# Patient Record
Sex: FEMALE | Race: WHITE | NOT HISPANIC OR LATINO | Employment: OTHER | URBAN - METROPOLITAN AREA
[De-identification: names, ages, dates, MRNs, and addresses within clinical notes are randomized per-mention and may not be internally consistent; named-entity substitution may affect disease eponyms.]

---

## 2017-06-05 ENCOUNTER — OFFICE VISIT (OUTPATIENT)
Dept: ORTHOPEDICS | Facility: CLINIC | Age: 47
End: 2017-06-05
Payer: MEDICARE

## 2017-06-05 ENCOUNTER — HOSPITAL ENCOUNTER (OUTPATIENT)
Dept: RADIOLOGY | Facility: HOSPITAL | Age: 47
Discharge: HOME | End: 2017-06-05
Attending: ORTHOPAEDIC SURGERY
Payer: MEDICARE

## 2017-06-05 VITALS — HEIGHT: 63 IN | WEIGHT: 190 LBS | BODY MASS INDEX: 33.66 KG/M2

## 2017-06-05 DIAGNOSIS — R52 PAIN: ICD-10-CM

## 2017-06-05 DIAGNOSIS — R52 PAIN: Primary | ICD-10-CM

## 2017-06-05 DIAGNOSIS — M16.11 OSTEOARTHRITIS OF RIGHT HIP, UNSPECIFIED OSTEOARTHRITIS TYPE: Primary | ICD-10-CM

## 2017-06-05 PROCEDURE — 99213 OFFICE O/P EST LOW 20 MIN: CPT | Mod: PBBFAC,25 | Performed by: PHYSICIAN ASSISTANT

## 2017-06-05 PROCEDURE — 73502 X-RAY EXAM HIP UNI 2-3 VIEWS: CPT | Mod: TC,RT

## 2017-06-05 PROCEDURE — 73502 X-RAY EXAM HIP UNI 2-3 VIEWS: CPT | Mod: 26,RT,, | Performed by: RADIOLOGY

## 2017-06-05 PROCEDURE — 99203 OFFICE O/P NEW LOW 30 MIN: CPT | Mod: S$PBB,,, | Performed by: PHYSICIAN ASSISTANT

## 2017-06-05 PROCEDURE — 99999 PR PBB SHADOW E&M-EST. PATIENT-LVL III: CPT | Mod: PBBFAC,,, | Performed by: PHYSICIAN ASSISTANT

## 2017-06-05 RX ORDER — DICLOFENAC SODIUM 75 MG/1
75 TABLET, DELAYED RELEASE ORAL 2 TIMES DAILY
Qty: 60 TABLET | Refills: 3 | Status: SHIPPED | OUTPATIENT
Start: 2017-06-05 | End: 2017-09-23 | Stop reason: SDUPTHER

## 2017-06-09 NOTE — PROGRESS NOTES
Subjective:      Patient ID: Nidhi Avendaño is a 46 y.o. female.    Chief Complaint: Pain of the Right Hip and Hip Pain (right hip pain rates it a 9 states the pain is unbearable )    HPI    Patient is a 46 year old female who presents to clinic to establish care for advanced degenerative changes in her right hip. Patient stated that she was previously see by an orthopedist at [facility] who informed her that she has degenerative changes in her hip. She reports that he obtained an MRI which was negative for AVN, fracture, but possible labrum tearing. Patient does not have copy of MRI or report with her. Patient stated that he suggested a diagnostic therapeutic interarticular injection, but she never got a call to set it up so she is changing providers.   Patient stated that she is having constant groin pain. Pain is increased with range of motion and weightbearing. Patient has taken Advil. Tramadol and Tylenol 3 with minimal relief. Patient denied back pain numbness or tingling. Denied prior injection, trauma or surgery. Denied fever shills increased warmth night sweats.     Review of Systems   Constitution: Negative for chills and fever.   Cardiovascular: Negative for chest pain.   Respiratory: Negative for cough and shortness of breath.    Skin: Negative for color change, dry skin, itching, nail changes, poor wound healing and rash.   Musculoskeletal:        Right hip pain   Neurological: Negative for dizziness.   Psychiatric/Behavioral: Negative for altered mental status. The patient is not nervous/anxious.    All other systems reviewed and are negative.        Objective:      General    Constitutional: She is oriented to person, place, and time. She appears well-developed and well-nourished. No distress.   HENT:   Head: Atraumatic.   Eyes: Conjunctivae are normal.   Cardiovascular: Normal rate.    Pulmonary/Chest: Effort normal.   Neurological: She is alert and oriented to person, place, and time.    Psychiatric: She has a normal mood and affect. Her behavior is normal.             Right Hip Exam     Range of Motion   Extension: normal   Flexion: normal   Internal Rotation: normal   External Rotation: normal     Tests   Log Roll: positive    Other   Sensation: normal    Comments:  Increased pain with all ROM      Muscle Strength   Right Lower Extremity   Hip Abduction: 3/5   Hip Adduction: 3/5   Hip Flexion: 4/5   Ankle Dorsiflexion:  5/5             RADS: significant joint space narrowing in her right hip.   Assessment:       Encounter Diagnosis   Name Primary?    Osteoarthritis of right hip, unspecified osteoarthritis type Yes          Plan:       Discussed plan with patient . She would like to avoid any surgical intervention unless necessary. Patient would like interarticular injection as recommended by her previous orthopedist. Order placed for injection, therapeutic and diagnostic. Patient is to return to clinic as needed. She understand the surgical intervention may be needed in future.

## 2017-06-19 ENCOUNTER — TELEPHONE (OUTPATIENT)
Dept: ORTHOPEDICS | Facility: CLINIC | Age: 47
End: 2017-06-19

## 2017-06-19 NOTE — TELEPHONE ENCOUNTER
----- Message from Sebastien Gomez sent at 6/19/2017  3:16 PM CDT -----  Contact: self@home, [phone]  Pt called in stating that she left a message earlier for a call back because she is in a lot of pain. Please return call as soon as possible    Thank you

## 2017-06-19 NOTE — TELEPHONE ENCOUNTER
Spoke with pt.   States she is in extreme pain in her hip.  Pt reports she is scheduled for hip injection on 6/22   Pt requesting something for pain.   Advised pt of the orthopedic department policy.  Pt verbalized understanding.   Forwarded message to SHANE Rawls for review.

## 2017-06-21 ENCOUNTER — TELEPHONE (OUTPATIENT)
Dept: RADIOLOGY | Facility: HOSPITAL | Age: 47
End: 2017-06-21

## 2017-06-22 ENCOUNTER — HOSPITAL ENCOUNTER (OUTPATIENT)
Dept: RADIOLOGY | Facility: HOSPITAL | Age: 47
Discharge: HOME | End: 2017-06-22
Attending: ORTHOPAEDIC SURGERY
Payer: MEDICARE

## 2017-06-22 VITALS
HEART RATE: 73 BPM | SYSTOLIC BLOOD PRESSURE: 130 MMHG | DIASTOLIC BLOOD PRESSURE: 72 MMHG | RESPIRATION RATE: 20 BRPM | OXYGEN SATURATION: 98 %

## 2017-06-22 DIAGNOSIS — M16.11 OSTEOARTHRITIS OF RIGHT HIP, UNSPECIFIED OSTEOARTHRITIS TYPE: ICD-10-CM

## 2017-06-22 PROCEDURE — 25500020 PHARM REV CODE 255: Performed by: ORTHOPAEDIC SURGERY

## 2017-06-22 PROCEDURE — 77002 NEEDLE LOCALIZATION BY XRAY: CPT | Mod: 26,GC,, | Performed by: RADIOLOGY

## 2017-06-22 PROCEDURE — 77002 NEEDLE LOCALIZATION BY XRAY: CPT | Mod: TC

## 2017-06-22 PROCEDURE — 20610 DRAIN/INJ JOINT/BURSA W/O US: CPT | Mod: GC,,, | Performed by: RADIOLOGY

## 2017-06-22 PROCEDURE — 63600175 PHARM REV CODE 636 W HCPCS: Performed by: ORTHOPAEDIC SURGERY

## 2017-06-22 PROCEDURE — 25000003 PHARM REV CODE 250: Performed by: ORTHOPAEDIC SURGERY

## 2017-06-22 RX ORDER — METHYLPREDNISOLONE ACETATE 80 MG/ML
60 INJECTION, SUSPENSION INTRA-ARTICULAR; INTRALESIONAL; INTRAMUSCULAR; SOFT TISSUE ONCE
Status: COMPLETED | OUTPATIENT
Start: 2017-06-22 | End: 2017-06-22

## 2017-06-22 RX ORDER — LIDOCAINE HYDROCHLORIDE 10 MG/ML
5 INJECTION, SOLUTION EPIDURAL; INFILTRATION; INTRACAUDAL; PERINEURAL ONCE
Status: COMPLETED | OUTPATIENT
Start: 2017-06-22 | End: 2017-06-22

## 2017-06-22 RX ORDER — BUPIVACAINE HYDROCHLORIDE 2.5 MG/ML
5 INJECTION, SOLUTION EPIDURAL; INFILTRATION; INTRACAUDAL ONCE
Status: COMPLETED | OUTPATIENT
Start: 2017-06-22 | End: 2017-06-22

## 2017-06-22 RX ADMIN — IOHEXOL 1 ML: 300 INJECTION, SOLUTION INTRAVENOUS at 11:06

## 2017-06-22 RX ADMIN — LIDOCAINE HYDROCHLORIDE 50 MG: 10 INJECTION, SOLUTION EPIDURAL; INFILTRATION; INTRACAUDAL; PERINEURAL at 11:06

## 2017-06-22 RX ADMIN — METHYLPREDNISOLONE ACETATE 60 MG: 80 INJECTION, SUSPENSION INTRA-ARTICULAR; INTRALESIONAL; INTRAMUSCULAR; SOFT TISSUE at 11:06

## 2017-06-22 RX ADMIN — BUPIVACAINE HYDROCHLORIDE 12.5 MG: 2.5 INJECTION, SOLUTION EPIDURAL; INFILTRATION; INTRACAUDAL; PERINEURAL at 11:06

## 2017-06-22 NOTE — PROGRESS NOTES
Medications needed for hip injection with DR. Herrera provided. VSS. No complaints at this time. Pt in care of Alex Kim and MD.

## 2017-07-19 ENCOUNTER — TELEPHONE (OUTPATIENT)
Dept: ORTHOPEDICS | Facility: CLINIC | Age: 47
End: 2017-07-19

## 2017-07-19 NOTE — TELEPHONE ENCOUNTER
Ortho Telephone Triage Message  [phone]  Patient C/O:  Worsening Left hip pain beginning on 7/16/17. Pt states that she had received a steroid injection for R hip pain and believes L hip pain increased after steroid injection wore off.  Pt states that she was seen at [facility] ED yesterday morning. Pt states no xrays were taken. Pt received Dilaudid injection for pain.  Pt requests Ortho appt for left hip pain.   HX: OA R hip  Resolution: Appt scheduled with SHANE Reyes on 7/26/17 at 7:00am. Pt states understanding. Appt slip mailed.

## 2017-07-19 NOTE — TELEPHONE ENCOUNTER
----- Message from Wenceslao Coronel sent at 7/19/2017 10:38 AM CDT -----  Contact: self/home  Pt went to the ED last night with lt hip pain and would like to be seen today.

## 2017-09-25 RX ORDER — DICLOFENAC SODIUM 75 MG/1
TABLET, DELAYED RELEASE ORAL
Qty: 60 TABLET | Refills: 0 | Status: SHIPPED | OUTPATIENT
Start: 2017-09-25 | End: 2020-07-13

## 2017-09-25 RX ORDER — DICLOFENAC SODIUM 75 MG/1
TABLET, DELAYED RELEASE ORAL
Qty: 180 TABLET | Refills: 0 | OUTPATIENT
Start: 2017-09-25

## 2020-01-06 ENCOUNTER — OFFICE VISIT (OUTPATIENT)
Dept: FAMILY MEDICINE | Facility: CLINIC | Age: 50
End: 2020-01-06
Payer: MEDICARE

## 2020-01-06 VITALS
SYSTOLIC BLOOD PRESSURE: 152 MMHG | HEART RATE: 89 BPM | OXYGEN SATURATION: 98 % | RESPIRATION RATE: 20 BRPM | BODY MASS INDEX: 33.48 KG/M2 | WEIGHT: 188.94 LBS | HEIGHT: 63 IN | TEMPERATURE: 99 F | DIASTOLIC BLOOD PRESSURE: 80 MMHG

## 2020-01-06 DIAGNOSIS — F19.90 ILLICIT DRUG USE: ICD-10-CM

## 2020-01-06 DIAGNOSIS — Z13.220 ENCOUNTER FOR LIPID SCREENING FOR CARDIOVASCULAR DISEASE: ICD-10-CM

## 2020-01-06 DIAGNOSIS — Z12.31 ENCOUNTER FOR SCREENING MAMMOGRAM FOR BREAST CANCER: ICD-10-CM

## 2020-01-06 DIAGNOSIS — G47.00 INSOMNIA, UNSPECIFIED TYPE: ICD-10-CM

## 2020-01-06 DIAGNOSIS — I10 ESSENTIAL HYPERTENSION: ICD-10-CM

## 2020-01-06 DIAGNOSIS — Z00.00 ANNUAL PHYSICAL EXAM: Primary | ICD-10-CM

## 2020-01-06 DIAGNOSIS — M16.12 OSTEOARTHRITIS OF LEFT HIP, UNSPECIFIED OSTEOARTHRITIS TYPE: ICD-10-CM

## 2020-01-06 DIAGNOSIS — F43.10 PTSD (POST-TRAUMATIC STRESS DISORDER): ICD-10-CM

## 2020-01-06 DIAGNOSIS — E66.9 OBESITY (BMI 30.0-34.9): ICD-10-CM

## 2020-01-06 DIAGNOSIS — N30.00 ACUTE CYSTITIS WITHOUT HEMATURIA: ICD-10-CM

## 2020-01-06 DIAGNOSIS — Z13.6 ENCOUNTER FOR LIPID SCREENING FOR CARDIOVASCULAR DISEASE: ICD-10-CM

## 2020-01-06 PROCEDURE — 3079F PR MOST RECENT DIASTOLIC BLOOD PRESSURE 80-89 MM HG: ICD-10-PCS | Mod: CPTII,S$GLB,, | Performed by: FAMILY MEDICINE

## 2020-01-06 PROCEDURE — 3079F DIAST BP 80-89 MM HG: CPT | Mod: CPTII,S$GLB,, | Performed by: FAMILY MEDICINE

## 2020-01-06 PROCEDURE — 3077F PR MOST RECENT SYSTOLIC BLOOD PRESSURE >= 140 MM HG: ICD-10-PCS | Mod: CPTII,S$GLB,, | Performed by: FAMILY MEDICINE

## 2020-01-06 PROCEDURE — 96372 PR INJECTION,THERAP/PROPH/DIAG2ST, IM OR SUBCUT: ICD-10-PCS | Mod: S$GLB,,, | Performed by: FAMILY MEDICINE

## 2020-01-06 PROCEDURE — 96372 THER/PROPH/DIAG INJ SC/IM: CPT | Mod: S$GLB,,, | Performed by: FAMILY MEDICINE

## 2020-01-06 PROCEDURE — 87086 URINE CULTURE/COLONY COUNT: CPT

## 2020-01-06 PROCEDURE — 99386 PR PREVENTIVE VISIT,NEW,40-64: ICD-10-PCS | Mod: 25,S$GLB,, | Performed by: FAMILY MEDICINE

## 2020-01-06 PROCEDURE — 99386 PREV VISIT NEW AGE 40-64: CPT | Mod: 25,S$GLB,, | Performed by: FAMILY MEDICINE

## 2020-01-06 PROCEDURE — 99999 PR PBB SHADOW E&M-EST. PATIENT-LVL IV: ICD-10-PCS | Mod: PBBFAC,,, | Performed by: FAMILY MEDICINE

## 2020-01-06 PROCEDURE — 99999 PR PBB SHADOW E&M-EST. PATIENT-LVL IV: CPT | Mod: PBBFAC,,, | Performed by: FAMILY MEDICINE

## 2020-01-06 PROCEDURE — 3077F SYST BP >= 140 MM HG: CPT | Mod: CPTII,S$GLB,, | Performed by: FAMILY MEDICINE

## 2020-01-06 RX ORDER — SULFAMETHOXAZOLE AND TRIMETHOPRIM 800; 160 MG/1; MG/1
1 TABLET ORAL 2 TIMES DAILY
Qty: 10 TABLET | Refills: 0 | Status: SHIPPED | OUTPATIENT
Start: 2020-01-06 | End: 2020-01-11

## 2020-01-06 RX ORDER — MONTELUKAST SODIUM 10 MG/1
TABLET ORAL
COMMUNITY
End: 2020-05-29 | Stop reason: SDUPTHER

## 2020-01-06 RX ORDER — MELATONIN 3 MG
LOZENGE ORAL NIGHTLY
COMMUNITY

## 2020-01-06 RX ORDER — HYDROXYZINE HYDROCHLORIDE 25 MG/1
25 TABLET, FILM COATED ORAL NIGHTLY PRN
Qty: 30 TABLET | Refills: 2 | Status: SHIPPED | OUTPATIENT
Start: 2020-01-06 | End: 2020-05-21 | Stop reason: SDUPTHER

## 2020-01-06 RX ORDER — HYDROXYZINE HYDROCHLORIDE 25 MG/1
TABLET, FILM COATED ORAL
COMMUNITY
End: 2020-01-06 | Stop reason: SDUPTHER

## 2020-01-06 RX ORDER — OMEPRAZOLE 20 MG/1
CAPSULE, DELAYED RELEASE ORAL
COMMUNITY
End: 2020-08-10

## 2020-01-06 RX ORDER — NALOXONE HYDROCHLORIDE 4 MG/.1ML
SPRAY NASAL
COMMUNITY
End: 2020-10-09

## 2020-01-06 RX ORDER — BUPRENORPHINE HYDROCHLORIDE AND NALOXONE HYDROCHLORIDE DIHYDRATE 8; 2 MG/1; MG/1
TABLET SUBLINGUAL 2 TIMES DAILY
COMMUNITY

## 2020-01-06 RX ORDER — MELOXICAM 15 MG/1
TABLET ORAL
COMMUNITY
End: 2020-09-01

## 2020-01-06 RX ORDER — IBUPROFEN 200 MG
TABLET ORAL
COMMUNITY
End: 2020-09-01

## 2020-01-06 RX ORDER — KETOROLAC TROMETHAMINE 30 MG/ML
60 INJECTION, SOLUTION INTRAMUSCULAR; INTRAVENOUS
Status: COMPLETED | OUTPATIENT
Start: 2020-01-06 | End: 2020-01-06

## 2020-01-06 RX ORDER — FLUCONAZOLE 150 MG/1
150 TABLET ORAL DAILY
Qty: 1 TABLET | Refills: 0 | Status: SHIPPED | OUTPATIENT
Start: 2020-01-06 | End: 2020-01-07

## 2020-01-06 RX ORDER — ALBUTEROL SULFATE 0.63 MG/3ML
0.63 SOLUTION RESPIRATORY (INHALATION) EVERY 6 HOURS PRN
Qty: 1 BOX | Refills: 3 | Status: SHIPPED | OUTPATIENT
Start: 2020-01-06 | End: 2021-05-18 | Stop reason: SDUPTHER

## 2020-01-06 RX ORDER — TIZANIDINE 4 MG/1
4 TABLET ORAL 3 TIMES DAILY
COMMUNITY
End: 2020-11-04

## 2020-01-06 RX ORDER — AMLODIPINE BESYLATE 10 MG/1
10 TABLET ORAL DAILY
Qty: 90 TABLET | Refills: 3 | Status: SHIPPED | OUTPATIENT
Start: 2020-01-06 | End: 2021-04-07 | Stop reason: SDUPTHER

## 2020-01-06 RX ORDER — GABAPENTIN 300 MG/1
CAPSULE ORAL
COMMUNITY
End: 2020-09-01

## 2020-01-06 RX ORDER — DICLOFENAC SODIUM 10 MG/G
GEL TOPICAL
COMMUNITY
End: 2021-04-07 | Stop reason: SDUPTHER

## 2020-01-06 RX ORDER — PRAZOSIN HYDROCHLORIDE 2 MG/1
2 CAPSULE ORAL NIGHTLY
Qty: 90 CAPSULE | Refills: 3 | Status: SHIPPED | OUTPATIENT
Start: 2020-01-06 | End: 2020-10-09

## 2020-01-06 RX ADMIN — KETOROLAC TROMETHAMINE 60 MG: 30 INJECTION, SOLUTION INTRAMUSCULAR; INTRAVENOUS at 11:01

## 2020-01-06 NOTE — PROGRESS NOTES
.Pt received ketorolac inj. Tolerated well. Pt instructed to wait 15mins to be assessed for adverse reactions. verbalized understanding.

## 2020-01-06 NOTE — PROGRESS NOTES
Subjective:       Patient ID: Nidhi Avendaño is a 49 y.o. female.    Chief Complaint: Establish Care      HPI  49-year-old female presents to establish care.  Patient states her insurance recently changed a patient at this tablets care with another PCP.  States she needs refills on some her medications.    Patient was a previous heroin user.  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  Has history of severe right hip arthritis.  Patient states she is on Suboxone daily.  Feels the pain is not controlled.  States she follows with Orthopedics and Psychiatry.    Complains of a UTI.  Would like antibiotics.    Review of Systems   Constitutional: Negative.    HENT: Negative.    Respiratory: Negative.    Cardiovascular: Negative.    Gastrointestinal: Negative.    Endocrine: Negative.    Genitourinary: Negative.    Musculoskeletal: Negative.    Neurological: Negative.    Psychiatric/Behavioral: Negative.           Past Medical History:   Diagnosis Date    Asthma     Blood transfusion     Depression     Hypertension     Nervously anxious     Personal history of endometriosis     Strabismus     Uterus, adenomyosis      Past Surgical History:   Procedure Laterality Date    APPENDECTOMY      BLADDER SUSPENSION      CHOLECYSTECTOMY      HYSTERECTOMY      REIMPLANT URETER IN BLADDER      STRABISMUS SURGERY  06/05/13    mr recession OU     Family History   Problem Relation Age of Onset    Diabetes Mother     Thyroid disease Mother     Diabetes Maternal Grandmother     Glaucoma Maternal Grandmother     Hypertension Maternal Grandmother     Stroke Maternal Grandmother     Diabetes Maternal Grandfather     Glaucoma Maternal Grandfather     Hypertension Maternal Grandfather      Social History     Socioeconomic History    Marital status:      Spouse name: Not on file    Number of children: Not on file    Years of education: Not on file    Highest education level: Not on file   Occupational History     Not on file   Social Needs    Financial resource strain: Not on file    Food insecurity:     Worry: Not on file     Inability: Not on file    Transportation needs:     Medical: Not on file     Non-medical: Not on file   Tobacco Use    Smoking status: Current Every Day Smoker     Packs/day: 1.00     Types: Cigarettes   Substance and Sexual Activity    Alcohol use: No    Drug use: Yes     Types: IV, Heroin     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    Sexual activity: Not Currently     Birth control/protection: Other-see comments     Comment: vaginal cuff   Lifestyle    Physical activity:     Days per week: Not on file     Minutes per session: Not on file    Stress: Not on file   Relationships    Social connections:     Talks on phone: Not on file     Gets together: Not on file     Attends Jewish service: Not on file     Active member of club or organization: Not on file     Attends meetings of clubs or organizations: Not on file     Relationship status: Not on file   Other Topics Concern    Not on file   Social History Narrative    Not on file       Current Outpatient Medications:     albuterol (ACCUNEB) 0.63 mg/3 mL Nebu, Take 3 mLs (0.63 mg total) by nebulization every 6 (six) hours as needed., Disp: 1 Box, Rfl: 3    amLODIPine (NORVASC) 10 MG tablet, Take 1 tablet (10 mg total) by mouth once daily., Disp: 90 tablet, Rfl: 3    buprenorphine-naloxone 8-2 mg (SUBOXONE) 8-2 mg Subl, buprenorphine 8 mg-naloxone 2 mg sublingual tablet, Disp: , Rfl:     clonidine (CATAPRES) 0.1 MG tablet, Take 0.1 mg by mouth 3 (three) times daily., Disp: , Rfl:     diclofenac (VOLTAREN) 75 MG EC tablet, TAKE 1 TABLET(75 MG) BY MOUTH TWICE DAILY, Disp: 60 tablet, Rfl: 0    diclofenac sodium (VOLTAREN) 1 % Gel, diclofenac 1 % topical gel, Disp: , Rfl:     escitalopram (LEXAPRO) 10 MG tablet, Take 10 mg by mouth once daily., Disp: , Rfl:     gabapentin (NEURONTIN) 300 MG capsule, gabapentin 300 mg  "capsule  TK 1 C PO TID, Disp: , Rfl:     hydrOXYzine (VISTARIL) 50 MG Cap, Take 50 mg by mouth every evening., Disp: , Rfl:     hydrOXYzine HCl (ATARAX) 25 MG tablet, Take 1 tablet (25 mg total) by mouth nightly as needed for Itching or Anxiety., Disp: 30 tablet, Rfl: 2    ibuprofen (ADVIL) 200 MG tablet, Advil, Disp: , Rfl:     melatonin 1 mg/4 mL Drop, melatonin, Disp: , Rfl:     meloxicam (MOBIC) 15 MG tablet, meloxicam 15 mg tablet, Disp: , Rfl:     montelukast (SINGULAIR) 10 mg tablet, montelukast 10 mg tablet, Disp: , Rfl:     naloxone (NARCAN) 4 mg/actuation Spry, Narcan 4 mg/actuation nasal spray  LORI REP ALN, Disp: , Rfl:     omeprazole (PRILOSEC) 20 MG capsule, omeprazole 20 mg capsule,delayed release  Take 1 capsule every day by oral route., Disp: , Rfl:     omeprazole (PRILOSEC) 40 MG capsule, Take 40 mg by mouth once daily.  , Disp: , Rfl:     prazosin (MINIPRESS) 2 MG Cap, Take 1 capsule (2 mg total) by mouth every evening., Disp: 90 capsule, Rfl: 3    tiZANidine (ZANAFLEX) 4 MG tablet, tizanidine 4 mg tablet, Disp: , Rfl:     fluconazole (DIFLUCAN) 150 MG Tab, Take 1 tablet (150 mg total) by mouth once daily. for 1 day, Disp: 1 tablet, Rfl: 0    sulfamethoxazole-trimethoprim 800-160mg (BACTRIM DS) 800-160 mg Tab, Take 1 tablet by mouth 2 (two) times daily. for 5 days, Disp: 10 tablet, Rfl: 0  No current facility-administered medications for this visit.    Objective:      Vitals:    01/06/20 0958   BP: (!) 152/80   BP Location: Right arm   Patient Position: Sitting   BP Method: Large (Manual)   Pulse: 89   Resp: 20   Temp: 98.9 °F (37.2 °C)   TempSrc: Oral   SpO2: 98%   Weight: 85.7 kg (188 lb 15 oz)   Height: 5' 3" (1.6 m)       Physical Exam   Constitutional: She is oriented to person, place, and time. No distress.   HENT:   Head: Normocephalic and atraumatic.   Eyes: Conjunctivae are normal.   Neck: Neck supple.   Cardiovascular: Normal rate, regular rhythm and normal heart sounds. Exam " reveals no gallop and no friction rub.   No murmur heard.  Pulmonary/Chest: Effort normal and breath sounds normal. She has no wheezes. She has no rales.   Neurological: She is alert and oriented to person, place, and time.   Skin: Skin is warm and dry.   Psychiatric: She has a normal mood and affect. Her behavior is normal. Judgment and thought content normal.          Assessment:       1. Annual physical exam    2. Encounter for screening mammogram for breast cancer    3. Encounter for lipid screening for cardiovascular disease    4. Illicit drug use    5. Acute cystitis without hematuria    6. Essential hypertension    7. Insomnia, unspecified type    8. PTSD (post-traumatic stress disorder)    9. Osteoarthritis of left hip, unspecified osteoarthritis type    10. Obesity (BMI 30.0-34.9)        Plan:       Annual physical exam  -     Comprehensive metabolic panel; Future; Expected date: 01/06/2020    Encounter for screening mammogram for breast cancer  -     Mammo Digital Screening Bilat with CAD; Future; Expected date: 01/06/2020    Encounter for lipid screening for cardiovascular disease  -     Lipid panel; Future; Expected date: 01/06/2020    Illicit drug use  -     Hepatitis C antibody; Future; Expected date: 01/06/2020    Acute cystitis without hematuria  -     sulfamethoxazole-trimethoprim 800-160mg (BACTRIM DS) 800-160 mg Tab; Take 1 tablet by mouth 2 (two) times daily. for 5 days  Dispense: 10 tablet; Refill: 0  -     fluconazole (DIFLUCAN) 150 MG Tab; Take 1 tablet (150 mg total) by mouth once daily. for 1 day  Dispense: 1 tablet; Refill: 0  -     Urine culture    Essential hypertension  -     amLODIPine (NORVASC) 10 MG tablet; Take 1 tablet (10 mg total) by mouth once daily.  Dispense: 90 tablet; Refill: 3  -     CBC auto differential; Future; Expected date: 01/06/2020  -     TSH; Future; Expected date: 01/06/2020    Insomnia, unspecified type  -     hydrOXYzine HCl (ATARAX) 25 MG tablet; Take 1 tablet  (25 mg total) by mouth nightly as needed for Itching or Anxiety.  Dispense: 30 tablet; Refill: 2    PTSD (post-traumatic stress disorder)  -     prazosin (MINIPRESS) 2 MG Cap; Take 1 capsule (2 mg total) by mouth every evening.  Dispense: 90 capsule; Refill: 3    Osteoarthritis of left hip, unspecified osteoarthritis type  -     ketorolac injection 60 mg    Obesity (BMI 30.0-34.9)  -     Hemoglobin A1c; Future; Expected date: 01/06/2020    Other orders  -     albuterol (ACCUNEB) 0.63 mg/3 mL Nebu; Take 3 mLs (0.63 mg total) by nebulization every 6 (six) hours as needed.  Dispense: 1 Box; Refill: 3    sent refills. Advised pt to f/u with ortho and psych for suboxone and severe R hip OA.     Sent bactrim and sent UC for possible UTI.            Neal Galarza MD      Patient note was created using [employer].  Any errors in syntax or even information may not have been identified and edited on initial review prior to signing this note.

## 2020-01-07 LAB — BACTERIA UR CULT: NO GROWTH

## 2020-02-20 ENCOUNTER — HOSPITAL ENCOUNTER (OUTPATIENT)
Dept: RADIOLOGY | Facility: OTHER | Age: 50
Discharge: HOME | End: 2020-02-20
Attending: FAMILY MEDICINE
Payer: MEDICARE

## 2020-02-20 VITALS — WEIGHT: 188.94 LBS | BODY MASS INDEX: 33.48 KG/M2 | HEIGHT: 63 IN

## 2020-02-20 DIAGNOSIS — Z12.31 ENCOUNTER FOR SCREENING MAMMOGRAM FOR BREAST CANCER: ICD-10-CM

## 2020-02-20 PROCEDURE — 77063 MAMMO DIGITAL SCREENING BILAT WITH TOMOSYNTHESIS_CAD: ICD-10-PCS | Mod: 26,,, | Performed by: RADIOLOGY

## 2020-02-20 PROCEDURE — 77067 SCR MAMMO BI INCL CAD: CPT | Mod: TC

## 2020-02-20 PROCEDURE — 77067 SCR MAMMO BI INCL CAD: CPT | Mod: 26,,, | Performed by: RADIOLOGY

## 2020-02-20 PROCEDURE — 77063 BREAST TOMOSYNTHESIS BI: CPT | Mod: 26,,, | Performed by: RADIOLOGY

## 2020-02-20 PROCEDURE — 77067 MAMMO DIGITAL SCREENING BILAT WITH TOMOSYNTHESIS_CAD: ICD-10-PCS | Mod: 26,,, | Performed by: RADIOLOGY

## 2020-02-26 ENCOUNTER — TELEPHONE (OUTPATIENT)
Dept: FAMILY MEDICINE | Facility: CLINIC | Age: 50
End: 2020-02-26

## 2020-02-26 DIAGNOSIS — I10 ESSENTIAL HYPERTENSION: ICD-10-CM

## 2020-02-26 DIAGNOSIS — E87.6 HYPOKALEMIA: Primary | ICD-10-CM

## 2020-02-26 NOTE — TELEPHONE ENCOUNTER
I have ordered more blood work to assess the problem. Please see if pt can come for 8AM fasting lab

## 2020-02-26 NOTE — TELEPHONE ENCOUNTER
Notified patient of information below. Verbalized understanding. Pt currently drinking 7 to 8 can of coconut a day along with OTC potassium 99 mg daily. Stated she wondering if needing a prescription along with what she currently doing. Note will send message to see if still recommending medication.

## 2020-02-26 NOTE — TELEPHONE ENCOUNTER
----- Message from Hannah Molina PA-C sent at 2/26/2020  9:57 AM CST -----  Potassium is low.  Increase intake of foods like beans, dark leafy greens, potatoes, squash, yogurt, fish, avocados, mushrooms, and bananas.  All other labs an acceptable range

## 2020-03-02 ENCOUNTER — LAB VISIT (OUTPATIENT)
Dept: LAB | Facility: HOSPITAL | Age: 50
End: 2020-03-02
Attending: PHYSICIAN ASSISTANT
Payer: MEDICARE

## 2020-03-02 DIAGNOSIS — I10 ESSENTIAL HYPERTENSION: ICD-10-CM

## 2020-03-02 DIAGNOSIS — E87.6 HYPOKALEMIA: ICD-10-CM

## 2020-03-02 LAB — CORTIS SERPL-MCNC: 8.8 UG/DL (ref 4.3–22.4)

## 2020-03-02 PROCEDURE — 36415 COLL VENOUS BLD VENIPUNCTURE: CPT | Mod: PO

## 2020-03-02 PROCEDURE — 82533 TOTAL CORTISOL: CPT

## 2020-03-02 PROCEDURE — 84244 ASSAY OF RENIN: CPT

## 2020-03-02 PROCEDURE — 82088 ASSAY OF ALDOSTERONE: CPT

## 2020-03-04 LAB — ALDOST SERPL-MCNC: 45.4 NG/DL

## 2020-03-05 LAB — RENIN PLAS-CCNC: 0.6 NG/ML/H

## 2020-03-09 ENCOUNTER — TELEPHONE (OUTPATIENT)
Dept: FAMILY MEDICINE | Facility: CLINIC | Age: 50
End: 2020-03-09

## 2020-03-09 DIAGNOSIS — I10 ESSENTIAL HYPERTENSION: ICD-10-CM

## 2020-03-09 DIAGNOSIS — E87.6 HYPOKALEMIA: Primary | ICD-10-CM

## 2020-03-09 NOTE — TELEPHONE ENCOUNTER
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

## 2020-03-31 ENCOUNTER — TELEPHONE (OUTPATIENT)
Dept: ENDOCRINOLOGY | Facility: CLINIC | Age: 50
End: 2020-03-31

## 2020-03-31 NOTE — TELEPHONE ENCOUNTER
----- Message from Heuy Rader MD sent at 3/31/2020  9:12 AM CDT -----  Regarding: reschedule  Patient missed apt w/ me.  Please call and reschedule virtual this afternoon or later this week.  Only schedule if you talk to her b/c she no showed a lot.  Thanks!

## 2020-03-31 NOTE — TELEPHONE ENCOUNTER
Called pt and scheduled follow up in 2 month because pt dont have compatible phone.    Pt confirm apt.

## 2020-05-20 ENCOUNTER — TELEPHONE (OUTPATIENT)
Dept: FAMILY MEDICINE | Facility: CLINIC | Age: 50
End: 2020-05-20

## 2020-05-20 NOTE — TELEPHONE ENCOUNTER
----- Message from Sylvia Stone sent at 5/20/2020  8:52 AM CDT -----  Type: Patient Call Back    Who called: pt     What is the request in detail: pt states she has a painful bump under her right eyelid and it is starting to swell. She is asking if she should make appt with pcp or opthalmology.     Can the clinic reply by [facility]? No     Would the patient rather a call back or a response via [facility]? Call back     Best call back number: [phone]    Additional Information:

## 2020-05-21 ENCOUNTER — OFFICE VISIT (OUTPATIENT)
Dept: FAMILY MEDICINE | Facility: CLINIC | Age: 50
End: 2020-05-21
Payer: MEDICARE

## 2020-05-21 DIAGNOSIS — M54.16 LUMBAR RADICULOPATHY: Primary | ICD-10-CM

## 2020-05-21 DIAGNOSIS — M25.562 CHRONIC PAIN OF BOTH KNEES: ICD-10-CM

## 2020-05-21 DIAGNOSIS — G47.00 INSOMNIA, UNSPECIFIED TYPE: ICD-10-CM

## 2020-05-21 DIAGNOSIS — M25.561 CHRONIC PAIN OF BOTH KNEES: ICD-10-CM

## 2020-05-21 DIAGNOSIS — R50.9 FEVER, UNSPECIFIED FEVER CAUSE: ICD-10-CM

## 2020-05-21 DIAGNOSIS — H04.301 DACRYOCYSTITIS OF RIGHT LACRIMAL SAC: ICD-10-CM

## 2020-05-21 DIAGNOSIS — G89.29 CHRONIC PAIN OF BOTH KNEES: ICD-10-CM

## 2020-05-21 DIAGNOSIS — M25.551 BILATERAL HIP PAIN: ICD-10-CM

## 2020-05-21 DIAGNOSIS — M25.552 BILATERAL HIP PAIN: ICD-10-CM

## 2020-05-21 DIAGNOSIS — H00.012 HORDEOLUM EXTERNUM OF RIGHT LOWER EYELID: ICD-10-CM

## 2020-05-21 DIAGNOSIS — M51.36 DEGENERATION OF LUMBAR INTERVERTEBRAL DISC: ICD-10-CM

## 2020-05-21 DIAGNOSIS — M48.061 SPINAL STENOSIS OF LUMBAR REGION, UNSPECIFIED WHETHER NEUROGENIC CLAUDICATION PRESENT: ICD-10-CM

## 2020-05-21 PROCEDURE — 99215 PR OFFICE/OUTPT VISIT, EST, LEVL V, 40-54 MIN: ICD-10-PCS | Mod: S$GLB,,, | Performed by: FAMILY MEDICINE

## 2020-05-21 PROCEDURE — 99215 OFFICE O/P EST HI 40 MIN: CPT | Mod: S$GLB,,, | Performed by: FAMILY MEDICINE

## 2020-05-21 PROCEDURE — 99999 PR PBB SHADOW E&M-EST. PATIENT-LVL II: CPT | Mod: PBBFAC,,, | Performed by: FAMILY MEDICINE

## 2020-05-21 PROCEDURE — 99999 PR PBB SHADOW E&M-EST. PATIENT-LVL II: ICD-10-PCS | Mod: PBBFAC,,, | Performed by: FAMILY MEDICINE

## 2020-05-21 RX ORDER — ERYTHROMYCIN 5 MG/G
OINTMENT OPHTHALMIC EVERY 8 HOURS
Qty: 3.5 G | Refills: 0 | Status: SHIPPED | OUTPATIENT
Start: 2020-05-21 | End: 2020-07-13

## 2020-05-21 RX ORDER — HYDROXYZINE HYDROCHLORIDE 25 MG/1
25 TABLET, FILM COATED ORAL 2 TIMES DAILY PRN
Qty: 60 TABLET | Refills: 2 | Status: SHIPPED | OUTPATIENT
Start: 2020-05-21 | End: 2020-10-09

## 2020-05-21 NOTE — PROGRESS NOTES
Subjective:       Patient ID: Nidhi Avendaño is a 49 y.o. female.    Chief Complaint: No chief complaint on file.      HPI   49-year-old female presents for multiple issues.  Patient complains of stye and right.  States is painful.    Would like a referral to pain management for multiple joint issues.  States she takes Suboxone currently but feels her pain is not controlled well.  States she is taking cataracts more often and would like another refill.      Patient had a temp of 101°.  Denies any other symptoms.  Denies any COVID exposure but would like to get tested for antibody.    Review of Systems   Constitutional: Negative.    HENT: Negative.    Respiratory: Negative.    Cardiovascular: Negative.    Gastrointestinal: Negative.    Endocrine: Negative.    Genitourinary: Negative.    Musculoskeletal: Negative.    Neurological: Negative.    Psychiatric/Behavioral: Negative.           Past Medical History:   Diagnosis Date    Asthma     Blood transfusion     Depression     Hypertension     Nervously anxious     Personal history of endometriosis     Strabismus     Uterus, adenomyosis      Past Surgical History:   Procedure Laterality Date    APPENDECTOMY      BLADDER SUSPENSION      BREAST BIOPSY      BREAST CYST EXCISION      CHOLECYSTECTOMY      HYSTERECTOMY      REIMPLANT URETER IN BLADDER      STRABISMUS SURGERY  06/05/13    mr recession OU     Family History   Problem Relation Age of Onset    Diabetes Mother     Thyroid disease Mother     Diabetes Maternal Grandmother     Glaucoma Maternal Grandmother     Hypertension Maternal Grandmother     Stroke Maternal Grandmother     Diabetes Maternal Grandfather     Glaucoma Maternal Grandfather     Hypertension Maternal Grandfather      Social History     Socioeconomic History    Marital status:      Spouse name: Not on file    Number of children: Not on file    Years of education: Not on file    Highest education level: Not on  file   Occupational History    Not on file   Social Needs    Financial resource strain: Not on file    Food insecurity:     Worry: Not on file     Inability: Not on file    Transportation needs:     Medical: Not on file     Non-medical: Not on file   Tobacco Use    Smoking status: Current Every Day Smoker     Packs/day: 1.00     Types: Cigarettes   Substance and Sexual Activity    Alcohol use: No    Drug use: Yes     Types: IV, Heroin     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    Sexual activity: Not Currently     Birth control/protection: Other-see comments     Comment: vaginal cuff   Lifestyle    Physical activity:     Days per week: Not on file     Minutes per session: Not on file    Stress: Not on file   Relationships    Social connections:     Talks on phone: Not on file     Gets together: Not on file     Attends Caodaism service: Not on file     Active member of club or organization: Not on file     Attends meetings of clubs or organizations: Not on file     Relationship status: Not on file   Other Topics Concern    Not on file   Social History Narrative    Not on file       Current Outpatient Medications:     albuterol (ACCUNEB) 0.63 mg/3 mL Nebu, Take 3 mLs (0.63 mg total) by nebulization every 6 (six) hours as needed., Disp: 1 Box, Rfl: 3    amLODIPine (NORVASC) 10 MG tablet, Take 1 tablet (10 mg total) by mouth once daily., Disp: 90 tablet, Rfl: 3    buprenorphine-naloxone 8-2 mg (SUBOXONE) 8-2 mg Subl, buprenorphine 8 mg-naloxone 2 mg sublingual tablet, Disp: , Rfl:     clonidine (CATAPRES) 0.1 MG tablet, Take 0.1 mg by mouth 3 (three) times daily., Disp: , Rfl:     diclofenac (VOLTAREN) 75 MG EC tablet, TAKE 1 TABLET(75 MG) BY MOUTH TWICE DAILY, Disp: 60 tablet, Rfl: 0    diclofenac sodium (VOLTAREN) 1 % Gel, diclofenac 1 % topical gel, Disp: , Rfl:     erythromycin (ROMYCIN) ophthalmic ointment, Place into the right eye every 8 (eight) hours., Disp: 3.5 g, Rfl: 0     escitalopram (LEXAPRO) 10 MG tablet, Take 10 mg by mouth once daily., Disp: , Rfl:     gabapentin (NEURONTIN) 300 MG capsule, gabapentin 300 mg capsule  TK 1 C PO TID, Disp: , Rfl:     hydrOXYzine (VISTARIL) 50 MG Cap, Take 50 mg by mouth every evening., Disp: , Rfl:     hydroxyzine HCL (ATARAX) 25 MG tablet, Take 1 tablet (25 mg total) by mouth 2 (two) times daily as needed for Itching or Anxiety., Disp: 60 tablet, Rfl: 2    ibuprofen (ADVIL) 200 MG tablet, Advil, Disp: , Rfl:     melatonin 1 mg/4 mL Drop, melatonin, Disp: , Rfl:     meloxicam (MOBIC) 15 MG tablet, meloxicam 15 mg tablet, Disp: , Rfl:     montelukast (SINGULAIR) 10 mg tablet, montelukast 10 mg tablet, Disp: , Rfl:     naloxone (NARCAN) 4 mg/actuation Spry, Narcan 4 mg/actuation nasal spray  LORI REP ALN, Disp: , Rfl:     omeprazole (PRILOSEC) 20 MG capsule, omeprazole 20 mg capsule,delayed release  Take 1 capsule every day by oral route., Disp: , Rfl:     omeprazole (PRILOSEC) 40 MG capsule, Take 40 mg by mouth once daily.  , Disp: , Rfl:     prazosin (MINIPRESS) 2 MG Cap, Take 1 capsule (2 mg total) by mouth every evening., Disp: 90 capsule, Rfl: 3    tiZANidine (ZANAFLEX) 4 MG tablet, tizanidine 4 mg tablet, Disp: , Rfl:    Objective:      There were no vitals filed for this visit.    Physical Exam   Constitutional: She is oriented to person, place, and time. No distress.   HENT:   Head: Normocephalic and atraumatic.   Eyes: Conjunctivae are normal.   Neck: Neck supple.   Cardiovascular: Normal rate, regular rhythm and normal heart sounds. Exam reveals no gallop and no friction rub.   No murmur heard.  Pulmonary/Chest: Effort normal and breath sounds normal. She has no wheezes. She has no rales.   Neurological: She is alert and oriented to person, place, and time.   Skin: Skin is warm and dry.   Psychiatric: She has a normal mood and affect. Her behavior is normal. Judgment and thought content normal.          Assessment:       1.  Lumbar radiculopathy    2. Spinal stenosis of lumbar region, unspecified whether neurogenic claudication present    3. Degeneration of lumbar intervertebral disc    4. Bilateral hip pain    5. Chronic pain of both knees    6. Dacryocystitis of right lacrimal sac    7. Insomnia, unspecified type    8. Fever, unspecified fever cause    9. Hordeolum externum of right lower eyelid        Plan:       Lumbar radiculopathy  -     Ambulatory referral/consult to Pain Clinic; Future; Expected date: 05/28/2020    Spinal stenosis of lumbar region, unspecified whether neurogenic claudication present  -     Ambulatory referral/consult to Pain Clinic; Future; Expected date: 05/28/2020    Degeneration of lumbar intervertebral disc  -     Ambulatory referral/consult to Pain Clinic; Future; Expected date: 05/28/2020    Bilateral hip pain  -     Ambulatory referral/consult to Pain Clinic; Future; Expected date: 05/28/2020    Chronic pain of both knees  -     Ambulatory referral/consult to Pain Clinic; Future; Expected date: 05/28/2020    Dacryocystitis of right lacrimal sac  -     Ambulatory referral/consult to Ophthalmology; Future; Expected date: 05/28/2020    Insomnia, unspecified type  -     hydroxyzine HCL (ATARAX) 25 MG tablet; Take 1 tablet (25 mg total) by mouth 2 (two) times daily as needed for Itching or Anxiety.  Dispense: 60 tablet; Refill: 2    Fever, unspecified fever cause  -     COVID-19 (SARS CoV-2) IgG Antibody; Future; Expected date: 05/21/2020    Hordeolum externum of right lower eyelid  -     erythromycin (ROMYCIN) ophthalmic ointment; Place into the right eye every 8 (eight) hours.  Dispense: 3.5 g; Refill: 0    will try erythromycin for her eye and referred pt to ophth. Order ab test for pt. Referred pt to pain mgmt as well.    40 minute visit with more than 50% of time spent on counseling.     Follow up in about 3 months (around 8/21/2020).            Neal Galarza MD      Patient note was created using  [employer].  Any errors in syntax or even information may not have been identified and edited on initial review prior to signing this note.

## 2020-05-22 ENCOUNTER — TELEPHONE (OUTPATIENT)
Dept: FAMILY MEDICINE | Facility: CLINIC | Age: 50
End: 2020-05-22

## 2020-05-22 NOTE — TELEPHONE ENCOUNTER
----- Message from Neal Galarza MD sent at 5/21/2020  5:14 PM CDT -----  Please schedule pt for covid19 ab in 2 weeks. Please give referral number to patient for ophth.

## 2020-05-22 NOTE — TELEPHONE ENCOUNTER
Notified patient of information below. Verbalized understanding. Number given. Referral for pain management printed for  as requested by patient. Left at

## 2020-05-29 NOTE — TELEPHONE ENCOUNTER
----- Message from Nirali Rachel sent at 5/29/2020 10:25 AM CDT -----  Type: RX Refill Request    Who Called: Self     Have you contacted your pharmacy: yes     Refill or New Rx: Refill     RX Name and Strength:montelukast (SINGULAIR) 10 mg tablet    Preferred Pharmacy with phone number:[id number] [place] [address] AT [address] [phone] (Phone)  [phone] (Fax)        Local or Mail Order: Local         Would the patient rather a call back or a response via [facility]?   Call     Best Call Back Number: [phone]

## 2020-06-01 ENCOUNTER — TELEPHONE (OUTPATIENT)
Dept: FAMILY MEDICINE | Facility: CLINIC | Age: 50
End: 2020-06-01

## 2020-06-01 RX ORDER — MONTELUKAST SODIUM 10 MG/1
TABLET ORAL
Qty: 90 TABLET | Refills: 0 | Status: SHIPPED | OUTPATIENT
Start: 2020-06-01 | End: 2020-07-13

## 2020-06-01 NOTE — TELEPHONE ENCOUNTER
----- Message from Jayne Tan sent at 6/1/2020  1:48 PM CDT -----  Contact: ROSS GRADY [[id number]]  Name of Who is Calling: ROSS GRADY [[id number]]    What is the request in detail: Patient states her pain management provider needs her medical records as soon as possible. She states she went to urgent care and her blood pressure was elevated. Please contact to further discuss and advise      Can the clinic reply by [facility]: no    What Number to Call Back if not in [facility]: [phone]

## 2020-06-01 NOTE — TELEPHONE ENCOUNTER
Pt states she is not needing records sent, forgot she was supposed to see pain management through her

## 2020-06-03 ENCOUNTER — PATIENT OUTREACH (OUTPATIENT)
Dept: ADMINISTRATIVE | Facility: OTHER | Age: 50
End: 2020-06-03

## 2020-06-03 NOTE — PROGRESS NOTES
Patient's chart was reviewed.   Requested updates within Care Everywhere.  Health Maintenance was updated.

## 2020-06-05 ENCOUNTER — LAB VISIT (OUTPATIENT)
Dept: LAB | Facility: HOSPITAL | Age: 50
End: 2020-06-05
Attending: FAMILY MEDICINE
Payer: MEDICARE

## 2020-06-05 DIAGNOSIS — R50.9 FEVER, UNSPECIFIED FEVER CAUSE: ICD-10-CM

## 2020-06-05 LAB — SARS-COV-2 IGG SERPLBLD QL IA.RAPID: NEGATIVE

## 2020-06-05 PROCEDURE — 86769 SARS-COV-2 COVID-19 ANTIBODY: CPT

## 2020-06-05 PROCEDURE — 36415 COLL VENOUS BLD VENIPUNCTURE: CPT | Mod: PO

## 2020-06-09 ENCOUNTER — PATIENT OUTREACH (OUTPATIENT)
Dept: ADMINISTRATIVE | Facility: OTHER | Age: 50
End: 2020-06-09

## 2020-06-09 NOTE — PROGRESS NOTES
Patient's chart was reviewed.   Requested updates within Care Everywhere.  Immunizations unable to be reconciled.    Health Maintenance was updated.

## 2020-06-16 ENCOUNTER — TELEPHONE (OUTPATIENT)
Dept: OPHTHALMOLOGY | Facility: CLINIC | Age: 50
End: 2020-06-16

## 2020-06-24 ENCOUNTER — PATIENT OUTREACH (OUTPATIENT)
Dept: ADMINISTRATIVE | Facility: OTHER | Age: 50
End: 2020-06-24

## 2020-06-24 NOTE — PROGRESS NOTES
Requested updates within Care Everywhere.  Patient's chart was reviewed for overdue YANELI topics.  Orders placed: N/A  Tasked appts:N/A  Labs Linked:N/A

## 2020-06-25 ENCOUNTER — TELEPHONE (OUTPATIENT)
Dept: OPHTHALMOLOGY | Facility: CLINIC | Age: 50
End: 2020-06-25

## 2020-07-05 ENCOUNTER — PATIENT OUTREACH (OUTPATIENT)
Dept: ADMINISTRATIVE | Facility: OTHER | Age: 50
End: 2020-07-05

## 2020-07-06 ENCOUNTER — TELEPHONE (OUTPATIENT)
Dept: OBSTETRICS AND GYNECOLOGY | Facility: CLINIC | Age: 50
End: 2020-07-06

## 2020-07-06 NOTE — TELEPHONE ENCOUNTER
Called patient to schedule annual, unable to reach patient left vm       ----- Message from Alma Rosa Humphreys sent at 7/6/2020  8:12 AM CDT -----  Regarding: Cancel  Type   Cancel Appointment Request    Name of Caller:patient unable to make appointment scheduled this morning have an emergency  When is the first available appointment?  Symptoms:  Best Call Back Number:[phone]  Additional Information:

## 2020-07-13 ENCOUNTER — TELEPHONE (OUTPATIENT)
Dept: FAMILY MEDICINE | Facility: CLINIC | Age: 50
End: 2020-07-13

## 2020-07-13 ENCOUNTER — OFFICE VISIT (OUTPATIENT)
Dept: FAMILY MEDICINE | Facility: CLINIC | Age: 50
End: 2020-07-13
Payer: MEDICARE

## 2020-07-13 VITALS
DIASTOLIC BLOOD PRESSURE: 70 MMHG | SYSTOLIC BLOOD PRESSURE: 112 MMHG | TEMPERATURE: 99 F | WEIGHT: 183.88 LBS | HEIGHT: 63 IN | OXYGEN SATURATION: 97 % | HEART RATE: 100 BPM | BODY MASS INDEX: 32.58 KG/M2 | RESPIRATION RATE: 16 BRPM

## 2020-07-13 DIAGNOSIS — G47.00 INSOMNIA, UNSPECIFIED TYPE: ICD-10-CM

## 2020-07-13 DIAGNOSIS — B37.0 ORAL THRUSH: ICD-10-CM

## 2020-07-13 DIAGNOSIS — F43.10 PTSD (POST-TRAUMATIC STRESS DISORDER): Primary | ICD-10-CM

## 2020-07-13 DIAGNOSIS — F41.9 ANXIETY: ICD-10-CM

## 2020-07-13 PROCEDURE — 99214 OFFICE O/P EST MOD 30 MIN: CPT | Mod: S$GLB,,, | Performed by: PHYSICIAN ASSISTANT

## 2020-07-13 PROCEDURE — 3008F BODY MASS INDEX DOCD: CPT | Mod: CPTII,S$GLB,, | Performed by: PHYSICIAN ASSISTANT

## 2020-07-13 PROCEDURE — 3078F DIAST BP <80 MM HG: CPT | Mod: CPTII,S$GLB,, | Performed by: PHYSICIAN ASSISTANT

## 2020-07-13 PROCEDURE — 3074F SYST BP LT 130 MM HG: CPT | Mod: CPTII,S$GLB,, | Performed by: PHYSICIAN ASSISTANT

## 2020-07-13 PROCEDURE — 99999 PR PBB SHADOW E&M-EST. PATIENT-LVL V: CPT | Mod: PBBFAC,,, | Performed by: PHYSICIAN ASSISTANT

## 2020-07-13 PROCEDURE — 99214 PR OFFICE/OUTPT VISIT, EST, LEVL IV, 30-39 MIN: ICD-10-PCS | Mod: S$GLB,,, | Performed by: PHYSICIAN ASSISTANT

## 2020-07-13 PROCEDURE — 3078F PR MOST RECENT DIASTOLIC BLOOD PRESSURE < 80 MM HG: ICD-10-PCS | Mod: CPTII,S$GLB,, | Performed by: PHYSICIAN ASSISTANT

## 2020-07-13 PROCEDURE — 3008F PR BODY MASS INDEX (BMI) DOCUMENTED: ICD-10-PCS | Mod: CPTII,S$GLB,, | Performed by: PHYSICIAN ASSISTANT

## 2020-07-13 PROCEDURE — 3074F PR MOST RECENT SYSTOLIC BLOOD PRESSURE < 130 MM HG: ICD-10-PCS | Mod: CPTII,S$GLB,, | Performed by: PHYSICIAN ASSISTANT

## 2020-07-13 PROCEDURE — 99999 PR PBB SHADOW E&M-EST. PATIENT-LVL V: ICD-10-PCS | Mod: PBBFAC,,, | Performed by: PHYSICIAN ASSISTANT

## 2020-07-13 RX ORDER — TRAMADOL HYDROCHLORIDE 50 MG/1
TABLET ORAL
COMMUNITY
Start: 2020-06-08 | End: 2020-08-10

## 2020-07-13 RX ORDER — CHLORZOXAZONE 500 MG/1
TABLET ORAL
COMMUNITY
Start: 2020-06-08 | End: 2020-11-04

## 2020-07-13 RX ORDER — HYDROXYZINE PAMOATE 100 MG/1
100 CAPSULE ORAL NIGHTLY PRN
Qty: 30 CAPSULE | Refills: 1 | Status: SHIPPED | OUTPATIENT
Start: 2020-07-13 | End: 2020-08-10

## 2020-07-13 RX ORDER — ZOLPIDEM TARTRATE 5 MG/1
TABLET ORAL
COMMUNITY
End: 2020-08-10

## 2020-07-13 RX ORDER — PREGABALIN 50 MG/1
CAPSULE ORAL
COMMUNITY
Start: 2020-05-20 | End: 2020-08-11

## 2020-07-13 RX ORDER — METHOCARBAMOL 500 MG/1
TABLET, FILM COATED ORAL
COMMUNITY
Start: 2020-06-21 | End: 2020-08-10

## 2020-07-13 RX ORDER — IBUPROFEN 800 MG/1
TABLET ORAL
COMMUNITY
Start: 2020-06-21 | End: 2020-09-01

## 2020-07-13 RX ORDER — NYSTATIN 100000 [USP'U]/ML
4 SUSPENSION ORAL 4 TIMES DAILY
Qty: 112 ML | Refills: 0 | Status: SHIPPED | OUTPATIENT
Start: 2020-07-13 | End: 2020-07-20

## 2020-07-13 RX ORDER — CYCLOBENZAPRINE HCL 10 MG
TABLET ORAL
COMMUNITY
Start: 2020-07-11 | End: 2020-11-04

## 2020-07-13 NOTE — PROGRESS NOTES
Subjective:       Patient ID: Nidhi Avendaño is a 49 y.o. female.    Chief Complaint: Mouth Infection, Insomnia, and Anxiety    HPI 49-year-old female with anxiety, PTSD, history of drug abuse on Suboxone presents for thrush, anxiety and insomnia.  Patient states she is currently seeing Pain Management and is getting frequent steroid injections in her spine about once per week.  Since getting these injections she has developed thrush in her mouth with white discharge on her tongue.  She states she tried a full course of nystatin liquid, lozenges, an oral Diflucan which has helped some but not fully.  She denies pain or trouble swallowing.  She also complains of worsening anxiety and trouble sleeping over the past few months.  She states this stems from COVID and concern for her finances due to unemployment.  She currently takes hydroxyzine 25 mg during the day and 50 mg at night to help with sleep.  She states these are no longer helping and feels she needs something stronger.  She states she was on Ambien in the past for sleep which helped.  Social History     Socioeconomic History    Marital status:      Spouse name: Not on file    Number of children: Not on file    Years of education: Not on file    Highest education level: Not on file   Occupational History    Not on file   Social Needs    Financial resource strain: Not on file    Food insecurity     Worry: Not on file     Inability: Not on file    Transportation needs     Medical: Not on file     Non-medical: Not on file   Tobacco Use    Smoking status: Current Every Day Smoker     Packs/day: 1.00     Types: Cigarettes   Substance and Sexual Activity    Alcohol use: No    Drug use: Yes     Types: IV, Heroin     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    Sexual activity: Not Currently     Birth control/protection: Other-see comments     Comment: vaginal cuff   Lifestyle    Physical activity     Days per week: Not on file      Minutes per session: Not on file    Stress: Not on file   Relationships    Social connections     Talks on phone: Not on file     Gets together: Not on file     Attends Anabaptist service: Not on file     Active member of club or organization: Not on file     Attends meetings of clubs or organizations: Not on file     Relationship status: Not on file   Other Topics Concern    Not on file   Social History Narrative    Not on file       Review of Systems   HENT: Negative for dental problem and mouth sores.         +thrush   Psychiatric/Behavioral: Positive for dysphoric mood and sleep disturbance. Negative for suicidal ideas. The patient is nervous/anxious.          Objective:      Physical Exam  Constitutional:       Appearance: She is obese.   HENT:      Mouth/Throat:     Skin:     General: Skin is warm and dry.   Neurological:      General: No focal deficit present.      Mental Status: She is alert.   Psychiatric:         Mood and Affect: Mood is anxious.         Thought Content: Thought content normal.         Assessment:       1. PTSD (post-traumatic stress disorder)    2. Anxiety    3. Insomnia, unspecified type    4. Oral thrush        Plan:         Nidhi VILLARREAL was seen today for mouth infection, insomnia and anxiety.    Diagnoses and all orders for this visit:    PTSD (post-traumatic stress disorder)  -   stable    Anxiety  -   increased dose of hydroxyzine, patient states she cannot tolerate any SSRIs, has tried BuSpar in the past with no relief.     Insomnia, unspecified type  -     hydrOXYzine (VISTARIL) 100 MG capsule; Take 1 capsule (100 mg total) by mouth nightly as needed.    Oral thrush  -     nystatin (MYCOSTATIN) 100,000 unit/mL suspension; Take 4 mLs (400,000 Units total) by mouth 4 (four) times daily. for 7 days

## 2020-07-13 NOTE — TELEPHONE ENCOUNTER
Pt is requesting ambien for sleep, she stated nothing for long term.   I advised that I would have to check with you

## 2020-08-10 ENCOUNTER — OFFICE VISIT (OUTPATIENT)
Dept: FAMILY MEDICINE | Facility: CLINIC | Age: 50
End: 2020-08-10
Payer: MEDICARE

## 2020-08-10 ENCOUNTER — LAB VISIT (OUTPATIENT)
Dept: LAB | Facility: HOSPITAL | Age: 50
End: 2020-08-10
Attending: PHYSICIAN ASSISTANT
Payer: MEDICARE

## 2020-08-10 ENCOUNTER — TELEPHONE (OUTPATIENT)
Dept: FAMILY MEDICINE | Facility: CLINIC | Age: 50
End: 2020-08-10

## 2020-08-10 VITALS
DIASTOLIC BLOOD PRESSURE: 82 MMHG | BODY MASS INDEX: 32.93 KG/M2 | SYSTOLIC BLOOD PRESSURE: 144 MMHG | RESPIRATION RATE: 17 BRPM | HEART RATE: 92 BPM | HEIGHT: 63 IN | OXYGEN SATURATION: 98 % | TEMPERATURE: 99 F | WEIGHT: 185.88 LBS

## 2020-08-10 DIAGNOSIS — F43.10 PTSD (POST-TRAUMATIC STRESS DISORDER): Primary | ICD-10-CM

## 2020-08-10 DIAGNOSIS — F11.20 LONG-TERM CURRENT USE OF METHADONE FOR OPIATE DEPENDENCE: ICD-10-CM

## 2020-08-10 DIAGNOSIS — K21.9 GASTROESOPHAGEAL REFLUX DISEASE, ESOPHAGITIS PRESENCE NOT SPECIFIED: ICD-10-CM

## 2020-08-10 DIAGNOSIS — F51.4 NIGHT TERRORS: ICD-10-CM

## 2020-08-10 DIAGNOSIS — R19.5 CLAY-COLORED STOOLS: ICD-10-CM

## 2020-08-10 LAB
ALBUMIN SERPL BCP-MCNC: 3.9 G/DL (ref 3.5–5.2)
ALP SERPL-CCNC: 121 U/L (ref 55–135)
ALT SERPL W/O P-5'-P-CCNC: 16 U/L (ref 10–44)
AST SERPL-CCNC: 16 U/L (ref 10–40)
BILIRUB DIRECT SERPL-MCNC: <0.1 MG/DL (ref 0.1–0.3)
BILIRUB SERPL-MCNC: 0.2 MG/DL (ref 0.1–1)
PROT SERPL-MCNC: 8.2 G/DL (ref 6–8.4)

## 2020-08-10 PROCEDURE — 3079F PR MOST RECENT DIASTOLIC BLOOD PRESSURE 80-89 MM HG: ICD-10-PCS | Mod: CPTII,S$GLB,, | Performed by: PHYSICIAN ASSISTANT

## 2020-08-10 PROCEDURE — 99214 OFFICE O/P EST MOD 30 MIN: CPT | Mod: S$GLB,,, | Performed by: PHYSICIAN ASSISTANT

## 2020-08-10 PROCEDURE — 3008F BODY MASS INDEX DOCD: CPT | Mod: CPTII,S$GLB,, | Performed by: PHYSICIAN ASSISTANT

## 2020-08-10 PROCEDURE — 99499 RISK ADDL DX/OHS AUDIT: ICD-10-PCS | Mod: S$GLB,,, | Performed by: PHYSICIAN ASSISTANT

## 2020-08-10 PROCEDURE — 3079F DIAST BP 80-89 MM HG: CPT | Mod: CPTII,S$GLB,, | Performed by: PHYSICIAN ASSISTANT

## 2020-08-10 PROCEDURE — 99214 PR OFFICE/OUTPT VISIT, EST, LEVL IV, 30-39 MIN: ICD-10-PCS | Mod: S$GLB,,, | Performed by: PHYSICIAN ASSISTANT

## 2020-08-10 PROCEDURE — 80076 HEPATIC FUNCTION PANEL: CPT

## 2020-08-10 PROCEDURE — 99999 PR PBB SHADOW E&M-EST. PATIENT-LVL V: CPT | Mod: PBBFAC,,, | Performed by: PHYSICIAN ASSISTANT

## 2020-08-10 PROCEDURE — 3077F PR MOST RECENT SYSTOLIC BLOOD PRESSURE >= 140 MM HG: ICD-10-PCS | Mod: CPTII,S$GLB,, | Performed by: PHYSICIAN ASSISTANT

## 2020-08-10 PROCEDURE — 99499 UNLISTED E&M SERVICE: CPT | Mod: S$GLB,,, | Performed by: PHYSICIAN ASSISTANT

## 2020-08-10 PROCEDURE — 99999 PR PBB SHADOW E&M-EST. PATIENT-LVL V: ICD-10-PCS | Mod: PBBFAC,,, | Performed by: PHYSICIAN ASSISTANT

## 2020-08-10 PROCEDURE — 3008F PR BODY MASS INDEX (BMI) DOCUMENTED: ICD-10-PCS | Mod: CPTII,S$GLB,, | Performed by: PHYSICIAN ASSISTANT

## 2020-08-10 PROCEDURE — 3077F SYST BP >= 140 MM HG: CPT | Mod: CPTII,S$GLB,, | Performed by: PHYSICIAN ASSISTANT

## 2020-08-10 PROCEDURE — 36415 COLL VENOUS BLD VENIPUNCTURE: CPT | Mod: PO

## 2020-08-10 RX ORDER — FAMOTIDINE 40 MG/1
TABLET, FILM COATED ORAL
COMMUNITY
Start: 2020-06-29 | End: 2020-08-10 | Stop reason: SDUPTHER

## 2020-08-10 RX ORDER — HYDROXYZINE HYDROCHLORIDE 50 MG/1
50 TABLET, FILM COATED ORAL NIGHTLY
COMMUNITY
Start: 2020-05-16 | End: 2021-04-07 | Stop reason: SDUPTHER

## 2020-08-10 RX ORDER — DOXEPIN HYDROCHLORIDE 25 MG/1
25 CAPSULE ORAL NIGHTLY
Qty: 30 CAPSULE | Refills: 0 | Status: SHIPPED | OUTPATIENT
Start: 2020-08-10 | End: 2020-08-11

## 2020-08-10 RX ORDER — PRAZOSIN HYDROCHLORIDE 5 MG/1
5 CAPSULE ORAL NIGHTLY
Qty: 30 CAPSULE | Refills: 0 | Status: SHIPPED | OUTPATIENT
Start: 2020-08-10 | End: 2020-09-08

## 2020-08-10 RX ORDER — ACETAMINOPHEN, CAFFEINE, DIHYDROCODEINE BITARTRATE 320.5; 30; 16 MG/1; MG/1; MG/1
CAPSULE ORAL
COMMUNITY
Start: 2020-06-10 | End: 2020-10-09

## 2020-08-10 RX ORDER — FAMOTIDINE 40 MG/1
40 TABLET, FILM COATED ORAL DAILY
Qty: 30 TABLET | Refills: 2 | Status: SHIPPED | OUTPATIENT
Start: 2020-08-10

## 2020-08-10 RX ORDER — NYSTATIN 100000 [USP'U]/ML
SUSPENSION ORAL
COMMUNITY
End: 2020-08-10

## 2020-08-10 RX ORDER — BUPRENORPHINE AND NALOXONE 8; 2 MG/1; MG/1
FILM, SOLUBLE BUCCAL; SUBLINGUAL 2 TIMES DAILY
COMMUNITY
Start: 2020-08-06

## 2020-08-10 NOTE — PROGRESS NOTES
Subjective:       Patient ID: Nidhi Avendaño is a 49 y.o. female.    Chief Complaint: Anxiety and Stool Color Change (stared 6 weeks ago )    HPI:  49-year-old female presents for follow-up of anxiety and stool color change.  Patient states her anxiety has acutely worsened over the past 3 weeks where she is experiencing difficulty sleeping and severe night terrors.  She feels this is causing her anxiety to elevate during the day.  She currently takes hydroxyzine 100 mg in the evening with no change.  She also takes Minipress 2 mg in the evening which she states has helped greatly in the past for her night terrors but has since offered no relief.  One night she did try taking an extra Minipress in the middle of the night which helped little.  She states she has not started back with her job due to COVID also has worsened her anxiety.  She denies suicidal thoughts.  She also complains of yellow colored stool for the past 6 weeks.  She denies loose stool, abdominal pain, yellowing of the eyes.  She denies Tylenol use or the use of vitamins.  She does complain of right-sided flank pain which began 2 days ago.  Worse with certain positions and sleep at night.  She does not have her gallbladder.  Social History     Socioeconomic History    Marital status:      Spouse name: Not on file    Number of children: Not on file    Years of education: Not on file    Highest education level: Not on file   Occupational History    Not on file   Social Needs    Financial resource strain: Not on file    Food insecurity     Worry: Not on file     Inability: Not on file    Transportation needs     Medical: Not on file     Non-medical: Not on file   Tobacco Use    Smoking status: Current Every Day Smoker     Packs/day: 1.00     Types: Cigarettes   Substance and Sexual Activity    Alcohol use: No    Drug use: Yes     Types: IV, Heroin     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    Sexual activity: Not  Currently     Birth control/protection: Other-see comments     Comment: vaginal cuff   Lifestyle    Physical activity     Days per week: Not on file     Minutes per session: Not on file    Stress: Not on file   Relationships    Social connections     Talks on phone: Not on file     Gets together: Not on file     Attends Restoration service: Not on file     Active member of club or organization: Not on file     Attends meetings of clubs or organizations: Not on file     Relationship status: Not on file   Other Topics Concern    Not on file   Social History Narrative    Not on file       Review of Systems   Constitutional: Positive for fatigue. Negative for fever.   Gastrointestinal: Negative for blood in stool, change in bowel habit and change in bowel habit.        + yellow stools   Psychiatric/Behavioral: Positive for dysphoric mood and sleep disturbance. Negative for self-injury and suicidal ideas. The patient is nervous/anxious.          Objective:      Physical Exam  Constitutional:       Appearance: Normal appearance.   HENT:      Head: Normocephalic and atraumatic.   Abdominal:      General: Abdomen is flat. Bowel sounds are normal.      Palpations: There is no hepatomegaly.      Tenderness: There is no abdominal tenderness. There is no guarding. Negative signs include Casey's sign.   Musculoskeletal:        Back:    Neurological:      Mental Status: She is alert.   Psychiatric:         Mood and Affect: Mood is anxious.         Speech: Speech is rapid and pressured.         Assessment:       1. PTSD (post-traumatic stress disorder)    2. Night terrors    3. Oneal-colored stools    4. Gastroesophageal reflux disease, esophagitis presence not specified    5. Long-term current use of methadone for opiate dependence        Plan:         Nidhi VILLARREAL was seen today for anxiety and stool color change.    Diagnoses and all orders for this visit:    PTSD (post-traumatic stress disorder)  -     prazosin (MINIPRESS) 5  MG capsule; Take 1 capsule (5 mg total) by mouth every evening.  Increase Minipress to 5 mg per night.  Will put 2 mg on hold for now.  I advised we can increase weekly, to a maximum of 15 mg in the evening.  -     doxepin (SINEQUAN) 25 MG capsule; Take 1 capsule (25 mg total) by mouth every evening.  She will stop hydroxyzine 100 mg, trial of doxepin 25 mg in the evening.  She will follow-up with PCP in 3 weeks    Night terrors  -     prazosin (MINIPRESS) 5 MG capsule; Take 1 capsule (5 mg total) by mouth every evening.    Oneal-colored stools  -     Hepatic function panel; Future  -     do not feel her flank pain is related.  Advised seems to be muscular skeletal, try heating pad    Gastroesophageal reflux disease, esophagitis presence not specified  -     famotidine (PEPCID) 40 MG tablet; Take 1 tablet (40 mg total) by mouth once daily.    Long-term current use of methadone for opiate dependence   Stable on meds

## 2020-08-10 NOTE — TELEPHONE ENCOUNTER
Pt seen today, states she read the side effects of doxepin and she doesn't think it is a good idea for her to take this medication; she states it says people with asthma should not take this and she has asthma; please call her to discuss, I did inform her you were already gone for today

## 2020-08-11 ENCOUNTER — OFFICE VISIT (OUTPATIENT)
Dept: FAMILY MEDICINE | Facility: CLINIC | Age: 50
End: 2020-08-11
Payer: MEDICARE

## 2020-08-11 DIAGNOSIS — F41.0 PANIC ATTACK: Primary | ICD-10-CM

## 2020-08-11 PROCEDURE — 99215 OFFICE O/P EST HI 40 MIN: CPT | Mod: 95,,, | Performed by: FAMILY MEDICINE

## 2020-08-11 PROCEDURE — 99215 PR OFFICE/OUTPT VISIT, EST, LEVL V, 40-54 MIN: ICD-10-PCS | Mod: 95,,, | Performed by: FAMILY MEDICINE

## 2020-08-11 RX ORDER — ALPRAZOLAM 0.25 MG/1
0.25 TABLET ORAL 2 TIMES DAILY PRN
Qty: 30 TABLET | Refills: 0 | Status: SHIPPED | OUTPATIENT
Start: 2020-08-11 | End: 2020-09-01 | Stop reason: SDUPTHER

## 2020-08-11 NOTE — TELEPHONE ENCOUNTER
Pt called office very upset because she was unable to sleep last night with the new medication; she is now having anxiety attack because she looked at her AVS and it states long term methadone use, pt states she has never took methadone and she was only prescribed suboxone to help with her pain, she states this makes her seem like a drug seeker/user and that is why no one will prescribe the medication she is needing for sleep/anxiety; I talker her through installing [employer] terri and I scheduled virtual visit for today with Dr parrish

## 2020-08-11 NOTE — PROGRESS NOTES
The patient location is: work  The chief complaint leading to consultation is: anxiety  Visit type: audiovisual  Total time spent with patient: 15 mins  Each patient to whom he or she provides medical services by telemedicine is:  (1) informed of the relationship between the physician and patient and the respective role of any other health care provider with respect to management of the patient; and (2) notified that he or she may decline to receive medical services by telemedicine and may withdraw from such care at any time.      Subjective:       Patient ID: Nidhi Avendaño is a 49 y.o. female.    Chief Complaint: No chief complaint on file.      HPI  49-year-old female presents for anxiety.  Patient states her anxiety is very severe and she is starting to have panic attacks.  Patient was other psych meds previously but was able to get off medications.  States a anxiety is very severe and she has difficulty sleeping.  Denies any SI or HI.  Follows with [facility] for therapy.  Patient feels patient psychiatry there as well.    Review of Systems   Constitutional: Positive for activity change. Negative for unexpected weight change.   HENT: Negative.  Negative for hearing loss, rhinorrhea and trouble swallowing.    Eyes: Negative for discharge and visual disturbance.   Respiratory: Positive for wheezing. Negative for chest tightness.    Cardiovascular: Positive for palpitations. Negative for chest pain.   Gastrointestinal: Negative.  Negative for blood in stool, constipation, diarrhea and vomiting.   Endocrine: Negative.  Negative for polydipsia and polyuria.   Genitourinary: Negative.  Negative for difficulty urinating, dysuria, hematuria and menstrual problem.   Musculoskeletal: Positive for arthralgias and joint swelling. Negative for neck pain.   Neurological: Negative.  Negative for weakness and headaches.   Psychiatric/Behavioral: Positive for dysphoric mood. Negative for confusion. The patient is  nervous/anxious.           Past Medical History:   Diagnosis Date    Asthma     Blood transfusion     Depression     Hypertension     Nervously anxious     Personal history of endometriosis     Strabismus     Uterus, adenomyosis      Past Surgical History:   Procedure Laterality Date    APPENDECTOMY      BLADDER SUSPENSION      BREAST BIOPSY      BREAST CYST EXCISION      CHOLECYSTECTOMY      HYSTERECTOMY      REIMPLANT URETER IN BLADDER      STRABISMUS SURGERY  06/05/13    mr guerrero [facility]     Family History   Problem Relation Age of Onset    Diabetes Mother     Thyroid disease Mother     Diabetes Maternal Grandmother     Glaucoma Maternal Grandmother     Hypertension Maternal Grandmother     Stroke Maternal Grandmother     Diabetes Maternal Grandfather     Glaucoma Maternal Grandfather     Hypertension Maternal Grandfather      Social History     Socioeconomic History    Marital status:      Spouse name: Not on file    Number of children: Not on file    Years of education: Not on file    Highest education level: Not on file   Occupational History    Not on file   Social Needs    Financial resource strain: Not on file    Food insecurity     Worry: Not on file     Inability: Not on file    Transportation needs     Medical: Not on file     Non-medical: Not on file   Tobacco Use    Smoking status: Current Every Day Smoker     Packs/day: 1.00     Types: Cigarettes   Substance and Sexual Activity    Alcohol use: No    Drug use: Yes     Types: IV, Heroin     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    Sexual activity: Not Currently     Birth control/protection: Other-see comments     Comment: vaginal cuff   Lifestyle    Physical activity     Days per week: Not on file     Minutes per session: Not on file    Stress: Not on file   Relationships    Social connections     Talks on phone: Not on file     Gets together: Not on file     Attends Sabianist service: Not on  file     Active member of club or organization: Not on file     Attends meetings of clubs or organizations: Not on file     Relationship status: Not on file   Other Topics Concern    Not on file   Social History Narrative    Not on file       Current Outpatient Medications:     albuterol (ACCUNEB) 0.63 mg/3 mL Nebu, Take 3 mLs (0.63 mg total) by nebulization every 6 (six) hours as needed., Disp: 1 Box, Rfl: 3    ALPRAZolam (XANAX) 0.25 MG tablet, Take 1 tablet (0.25 mg total) by mouth 2 (two) times daily as needed for Anxiety., Disp: 30 tablet, Rfl: 0    amLODIPine (NORVASC) 10 MG tablet, Take 1 tablet (10 mg total) by mouth once daily., Disp: 90 tablet, Rfl: 3    buprenorphine-naloxone (SUBOXONE) 8-2 mg Film, , Disp: , Rfl:     buprenorphine-naloxone 8-2 mg (SUBOXONE) 8-2 mg Subl, buprenorphine 8 mg-naloxone 2 mg sublingual tablet, Disp: , Rfl:     chlorzoxazone (PARAFON FORTE) 500 mg Tab, , Disp: , Rfl:     clonidine (CATAPRES) 0.1 MG tablet, Take 0.1 mg by mouth 3 (three) times daily., Disp: , Rfl:     cyclobenzaprine (FLEXERIL) 10 MG tablet, , Disp: , Rfl:     diclofenac sodium (VOLTAREN) 1 % Gel, diclofenac 1 % topical gel, Disp: , Rfl:     famotidine (PEPCID) 40 MG tablet, Take 1 tablet (40 mg total) by mouth once daily., Disp: 30 tablet, Rfl: 2    gabapentin (NEURONTIN) 300 MG capsule, gabapentin 300 mg capsule  TK 1 C PO TID, Disp: , Rfl:     hydrOXYzine (ATARAX) 50 MG tablet, , Disp: , Rfl:     hydroxyzine HCL (ATARAX) 25 MG tablet, Take 1 tablet (25 mg total) by mouth 2 (two) times daily as needed for Itching or Anxiety. (Patient not taking: Reported on 7/13/2020), Disp: 60 tablet, Rfl: 2    ibuprofen (ADVIL) 200 MG tablet, Advil, Disp: , Rfl:     ibuprofen (ADVIL,MOTRIN) 800 MG tablet, , Disp: , Rfl:     melatonin 1 mg/4 mL Drop, melatonin, Disp: , Rfl:     meloxicam (MOBIC) 15 MG tablet, meloxicam 15 mg tablet, Disp: , Rfl:     naloxone (NARCAN) 4 mg/actuation Spry, Narcan 4  mg/actuation nasal spray  LORI REP ALN, Disp: , Rfl:     prazosin (MINIPRESS) 2 MG Cap, Take 1 capsule (2 mg total) by mouth every evening., Disp: 90 capsule, Rfl: 3    prazosin (MINIPRESS) 5 MG capsule, Take 1 capsule (5 mg total) by mouth every evening., Disp: 30 capsule, Rfl: 0    tiZANidine (ZANAFLEX) 4 MG tablet, Take 4 mg by mouth 3 (three) times daily. , Disp: , Rfl:     TREZIX 320.5-30-16 mg Cap, , Disp: , Rfl:    Objective:      There were no vitals filed for this visit.    Physical Exam  Constitutional:       General: She is not in acute distress.  HENT:      Head: Normocephalic and atraumatic.   Eyes:      Conjunctiva/sclera: Conjunctivae normal.   Neck:      Musculoskeletal: Neck supple.   Cardiovascular:      Rate and Rhythm: Normal rate and regular rhythm.      Heart sounds: Normal heart sounds. No murmur. No friction rub. No gallop.    Pulmonary:      Effort: Pulmonary effort is normal.      Breath sounds: Normal breath sounds. No wheezing or rales.   Skin:     General: Skin is warm and dry.   Neurological:      Mental Status: She is alert and oriented to person, place, and time.   Psychiatric:         Behavior: Behavior normal.         Thought Content: Thought content normal.         Judgment: Judgment normal.            Assessment:       1. Panic attack        Plan:       Panic attack  -     ALPRAZolam (XANAX) 0.25 MG tablet; Take 1 tablet (0.25 mg total) by mouth 2 (two) times daily as needed for Anxiety.  Dispense: 30 tablet; Refill: 0    given a short supply of xanax for prn use. Advised pt to f/u with [facility] for anxiety. Would benefit from daily meds at this time but pt has other meds she is taking.    Is on suboxone for her chronic pain. Not on methadone anymore.              Neal Galarza MD      Patient note was created using [employer].  Any errors in syntax or even information may not have been identified and edited on initial review prior to signing this note.

## 2020-08-18 ENCOUNTER — PATIENT OUTREACH (OUTPATIENT)
Dept: ADMINISTRATIVE | Facility: HOSPITAL | Age: 50
End: 2020-08-18

## 2020-09-01 ENCOUNTER — OFFICE VISIT (OUTPATIENT)
Dept: FAMILY MEDICINE | Facility: CLINIC | Age: 50
End: 2020-09-01
Payer: MEDICARE

## 2020-09-01 VITALS
RESPIRATION RATE: 20 BRPM | HEART RATE: 78 BPM | DIASTOLIC BLOOD PRESSURE: 76 MMHG | SYSTOLIC BLOOD PRESSURE: 138 MMHG | WEIGHT: 193.81 LBS | HEIGHT: 63 IN | BODY MASS INDEX: 34.34 KG/M2 | OXYGEN SATURATION: 98 % | TEMPERATURE: 99 F

## 2020-09-01 DIAGNOSIS — R19.5 CLAY-COLORED STOOLS: ICD-10-CM

## 2020-09-01 DIAGNOSIS — F41.9 ANXIETY: Primary | ICD-10-CM

## 2020-09-01 DIAGNOSIS — F41.0 PANIC ATTACK: ICD-10-CM

## 2020-09-01 PROCEDURE — 3078F PR MOST RECENT DIASTOLIC BLOOD PRESSURE < 80 MM HG: ICD-10-PCS | Mod: CPTII,S$GLB,, | Performed by: FAMILY MEDICINE

## 2020-09-01 PROCEDURE — 99215 PR OFFICE/OUTPT VISIT, EST, LEVL V, 40-54 MIN: ICD-10-PCS | Mod: S$GLB,,, | Performed by: FAMILY MEDICINE

## 2020-09-01 PROCEDURE — 99999 PR PBB SHADOW E&M-EST. PATIENT-LVL III: ICD-10-PCS | Mod: PBBFAC,,, | Performed by: FAMILY MEDICINE

## 2020-09-01 PROCEDURE — 3075F PR MOST RECENT SYSTOLIC BLOOD PRESS GE 130-139MM HG: ICD-10-PCS | Mod: CPTII,S$GLB,, | Performed by: FAMILY MEDICINE

## 2020-09-01 PROCEDURE — 99999 PR PBB SHADOW E&M-EST. PATIENT-LVL III: CPT | Mod: PBBFAC,,, | Performed by: FAMILY MEDICINE

## 2020-09-01 PROCEDURE — 3008F BODY MASS INDEX DOCD: CPT | Mod: CPTII,S$GLB,, | Performed by: FAMILY MEDICINE

## 2020-09-01 PROCEDURE — 3008F PR BODY MASS INDEX (BMI) DOCUMENTED: ICD-10-PCS | Mod: CPTII,S$GLB,, | Performed by: FAMILY MEDICINE

## 2020-09-01 PROCEDURE — 3075F SYST BP GE 130 - 139MM HG: CPT | Mod: CPTII,S$GLB,, | Performed by: FAMILY MEDICINE

## 2020-09-01 PROCEDURE — 99215 OFFICE O/P EST HI 40 MIN: CPT | Mod: S$GLB,,, | Performed by: FAMILY MEDICINE

## 2020-09-01 PROCEDURE — 3078F DIAST BP <80 MM HG: CPT | Mod: CPTII,S$GLB,, | Performed by: FAMILY MEDICINE

## 2020-09-01 RX ORDER — MONTELUKAST SODIUM 10 MG/1
TABLET, FILM COATED ORAL
COMMUNITY
Start: 2020-06-01 | End: 2020-11-04

## 2020-09-01 RX ORDER — HYDROXYZINE HYDROCHLORIDE 25 MG/1
TABLET, FILM COATED ORAL
COMMUNITY
Start: 2020-06-23 | End: 2020-10-09

## 2020-09-01 RX ORDER — GABAPENTIN 600 MG/1
600 TABLET ORAL
COMMUNITY
Start: 2020-08-17

## 2020-09-01 RX ORDER — TIZANIDINE 4 MG/1
4 TABLET ORAL 3 TIMES DAILY
COMMUNITY
Start: 2020-06-19 | End: 2021-04-07 | Stop reason: SDUPTHER

## 2020-09-01 RX ORDER — PRAZOSIN HYDROCHLORIDE 2 MG/1
CAPSULE ORAL
COMMUNITY
Start: 2020-06-23 | End: 2020-10-09

## 2020-09-01 RX ORDER — ALPRAZOLAM 0.25 MG/1
0.25 TABLET ORAL 2 TIMES DAILY PRN
Qty: 15 TABLET | Refills: 0 | Status: SHIPPED | OUTPATIENT
Start: 2020-09-01 | End: 2020-10-09

## 2020-09-01 NOTE — PROGRESS NOTES
Subjective:       Patient ID: Nidhi Avendaño is a 49 y.o. female.    Chief Complaint: No chief complaint on file.      HPI  49-year-old female presents for anxiety.  Patient states she has Xanax.  States she discuss medication with [facility] but states she was told they are unable to give the medication. Pt feels her anxiety can become severe and feels vistaril does not help. Denies any SI or HI. States she again started having angy color stools. Pt feels it may be due to anxiety. Felt it improved w/ xanax and again has restarted. Pt is s/p sy.     Review of Systems   Constitutional: Negative.    HENT: Negative.    Respiratory: Negative.    Cardiovascular: Negative.    Gastrointestinal: Negative.    Endocrine: Negative.    Genitourinary: Negative.    Musculoskeletal: Negative.    Neurological: Negative.    Psychiatric/Behavioral: Positive for sleep disturbance. The patient is nervous/anxious.           Past Medical History:   Diagnosis Date    Asthma     Blood transfusion     Depression     Hypertension     Nervously anxious     Personal history of endometriosis     Strabismus     Uterus, adenomyosis      Past Surgical History:   Procedure Laterality Date    APPENDECTOMY      BLADDER SUSPENSION      BREAST BIOPSY      BREAST CYST EXCISION      CHOLECYSTECTOMY      HYSTERECTOMY      REIMPLANT URETER IN BLADDER      STRABISMUS SURGERY  06/05/13    mr elzbietaion [facility]     Family History   Problem Relation Age of Onset    Diabetes Mother     Thyroid disease Mother     Diabetes Maternal Grandmother     Glaucoma Maternal Grandmother     Hypertension Maternal Grandmother     Stroke Maternal Grandmother     Diabetes Maternal Grandfather     Glaucoma Maternal Grandfather     Hypertension Maternal Grandfather      Social History     Socioeconomic History    Marital status:      Spouse name: Not on file    Number of children: Not on file    Years of education: Not on file     Highest education level: Not on file   Occupational History    Not on file   Social Needs    Financial resource strain: Not on file    Food insecurity     Worry: Not on file     Inability: Not on file    Transportation needs     Medical: Not on file     Non-medical: Not on file   Tobacco Use    Smoking status: Current Every Day Smoker     Packs/day: 1.00     Types: Cigarettes   Substance and Sexual Activity    Alcohol use: No    Drug use: Yes     Types: IV, Heroin     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    Sexual activity: Not Currently     Birth control/protection: Other-see comments     Comment: vaginal cuff   Lifestyle    Physical activity     Days per week: Not on file     Minutes per session: Not on file    Stress: Not on file   Relationships    Social connections     Talks on phone: Not on file     Gets together: Not on file     Attends Spiritism service: Not on file     Active member of club or organization: Not on file     Attends meetings of clubs or organizations: Not on file     Relationship status: Not on file   Other Topics Concern    Not on file   Social History Narrative    Not on file       Current Outpatient Medications:     hydrOXYzine HCL (ATARAX) 25 MG tablet, , Disp: , Rfl:     prazosin (MINIPRESS) 2 MG Cap, , Disp: , Rfl:     albuterol (ACCUNEB) 0.63 mg/3 mL Nebu, Take 3 mLs (0.63 mg total) by nebulization every 6 (six) hours as needed., Disp: 1 Box, Rfl: 3    ALPRAZolam (XANAX) 0.25 MG tablet, Take 1 tablet (0.25 mg total) by mouth 2 (two) times daily as needed for Anxiety., Disp: 15 tablet, Rfl: 0    amLODIPine (NORVASC) 10 MG tablet, Take 1 tablet (10 mg total) by mouth once daily., Disp: 90 tablet, Rfl: 3    buprenorphine-naloxone (SUBOXONE) 8-2 mg Film, , Disp: , Rfl:     buprenorphine-naloxone 8-2 mg (SUBOXONE) 8-2 mg Subl, buprenorphine 8 mg-naloxone 2 mg sublingual tablet, Disp: , Rfl:     chlorzoxazone (PARAFON FORTE) 500 mg Tab, , Disp: , Rfl:     " clonidine (CATAPRES) 0.1 MG tablet, Take 0.1 mg by mouth 3 (three) times daily., Disp: , Rfl:     cyclobenzaprine (FLEXERIL) 10 MG tablet, , Disp: , Rfl:     diclofenac sodium (VOLTAREN) 1 % Gel, diclofenac 1 % topical gel, Disp: , Rfl:     famotidine (PEPCID) 40 MG tablet, Take 1 tablet (40 mg total) by mouth once daily., Disp: 30 tablet, Rfl: 2    gabapentin (NEURONTIN) 600 MG tablet, , Disp: , Rfl:     hydrOXYzine (ATARAX) 50 MG tablet, , Disp: , Rfl:     hydroxyzine HCL (ATARAX) 25 MG tablet, Take 1 tablet (25 mg total) by mouth 2 (two) times daily as needed for Itching or Anxiety. (Patient not taking: Reported on 7/13/2020), Disp: 60 tablet, Rfl: 2    melatonin 1 mg/4 mL Drop, melatonin, Disp: , Rfl:     montelukast (SINGULAIR) 10 mg tablet, TAKE 1 TABLET BY MOUTH DAILY., Disp: 90 tablet, Rfl: 0    naloxone (NARCAN) 4 mg/actuation Spry, Narcan 4 mg/actuation nasal spray  LORI REP ALN, Disp: , Rfl:     prazosin (MINIPRESS) 2 MG Cap, Take 1 capsule (2 mg total) by mouth every evening., Disp: 90 capsule, Rfl: 3    prazosin (MINIPRESS) 5 MG capsule, Take 1 capsule (5 mg total) by mouth every evening., Disp: 30 capsule, Rfl: 0    SINGULAIR 10 mg tablet, , Disp: , Rfl:     tiZANidine (ZANAFLEX) 4 MG tablet, Take 4 mg by mouth 3 (three) times daily. , Disp: , Rfl:     tiZANidine (ZANAFLEX) 4 MG tablet, , Disp: , Rfl:     TREZIX 320.5-30-16 mg Cap, , Disp: , Rfl:    Objective:      Vitals:    09/01/20 1017   BP: 138/76   BP Location: Right arm   Patient Position: Sitting   BP Method: Large (Manual)   Pulse: 78   Resp: 20   Temp: 98.7 °F (37.1 °C)   TempSrc: Oral   SpO2: 98%   Weight: 87.9 kg (193 lb 12.6 oz)   Height: 5' 3" (1.6 m)       Physical Exam  Constitutional:       General: She is not in acute distress.  HENT:      Head: Normocephalic and atraumatic.   Eyes:      Conjunctiva/sclera: Conjunctivae normal.   Neck:      Musculoskeletal: Neck supple.   Cardiovascular:      Rate and Rhythm: Normal " rate and regular rhythm.      Heart sounds: Normal heart sounds. No murmur. No friction rub. No gallop.    Pulmonary:      Effort: Pulmonary effort is normal.      Breath sounds: Normal breath sounds. No wheezing or rales.   Skin:     General: Skin is warm and dry.   Neurological:      Mental Status: She is alert and oriented to person, place, and time.   Psychiatric:         Behavior: Behavior normal.         Thought Content: Thought content normal.         Judgment: Judgment normal.            Assessment:       1. Anxiety    2. Oneal-colored stools    3. Panic attack        Plan:       Anxiety  -     Ambulatory referral/consult to Psychiatry; Future; Expected date: 09/08/2020    Onela-colored stools  -     Ambulatory referral/consult to Gastroenterology; Future; Expected date: 09/08/2020    Panic attack  -     ALPRAZolam (XANAX) 0.25 MG tablet; Take 1 tablet (0.25 mg total) by mouth 2 (two) times daily as needed for Anxiety.  Dispense: 15 tablet; Refill: 0    Referred to gi for gi issue    Pt was advised she will not get a refill on xanax after this and instead she would benefit from additional daily meds to help her w/ her anxiety/PTSD along w/ CBT. Pt was advised to f/u with [facility] but did refer pt to psych as well.            Patient note was created using [employer].  Any errors in syntax or even information may not have been identified and edited on initial review prior to signing this note.

## 2020-09-28 ENCOUNTER — PATIENT OUTREACH (OUTPATIENT)
Dept: ADMINISTRATIVE | Facility: OTHER | Age: 50
End: 2020-09-28

## 2020-10-09 ENCOUNTER — OFFICE VISIT (OUTPATIENT)
Dept: ENDOCRINOLOGY | Facility: CLINIC | Age: 50
End: 2020-10-09
Payer: MEDICARE

## 2020-10-09 VITALS
WEIGHT: 178.56 LBS | RESPIRATION RATE: 16 BRPM | BODY MASS INDEX: 31.64 KG/M2 | HEART RATE: 87 BPM | HEIGHT: 63 IN | DIASTOLIC BLOOD PRESSURE: 78 MMHG | SYSTOLIC BLOOD PRESSURE: 128 MMHG

## 2020-10-09 DIAGNOSIS — F41.9 ANXIETY DISORDER, UNSPECIFIED: ICD-10-CM

## 2020-10-09 DIAGNOSIS — R23.2 HOT FLASHES: ICD-10-CM

## 2020-10-09 DIAGNOSIS — E87.6 HYPOKALEMIA: Primary | ICD-10-CM

## 2020-10-09 DIAGNOSIS — I10 HYPERTENSION, UNSPECIFIED TYPE: ICD-10-CM

## 2020-10-09 PROCEDURE — 3008F BODY MASS INDEX DOCD: CPT | Mod: CPTII,S$GLB,, | Performed by: INTERNAL MEDICINE

## 2020-10-09 PROCEDURE — 99204 PR OFFICE/OUTPT VISIT, NEW, LEVL IV, 45-59 MIN: ICD-10-PCS | Mod: S$GLB,,, | Performed by: INTERNAL MEDICINE

## 2020-10-09 PROCEDURE — 3074F SYST BP LT 130 MM HG: CPT | Mod: CPTII,S$GLB,, | Performed by: INTERNAL MEDICINE

## 2020-10-09 PROCEDURE — 3078F DIAST BP <80 MM HG: CPT | Mod: CPTII,S$GLB,, | Performed by: INTERNAL MEDICINE

## 2020-10-09 PROCEDURE — 99999 PR PBB SHADOW E&M-EST. PATIENT-LVL V: CPT | Mod: PBBFAC,,, | Performed by: INTERNAL MEDICINE

## 2020-10-09 PROCEDURE — 99499 RISK ADDL DX/OHS AUDIT: ICD-10-PCS | Mod: S$GLB,,, | Performed by: INTERNAL MEDICINE

## 2020-10-09 PROCEDURE — 3074F PR MOST RECENT SYSTOLIC BLOOD PRESSURE < 130 MM HG: ICD-10-PCS | Mod: CPTII,S$GLB,, | Performed by: INTERNAL MEDICINE

## 2020-10-09 PROCEDURE — 3008F PR BODY MASS INDEX (BMI) DOCUMENTED: ICD-10-PCS | Mod: CPTII,S$GLB,, | Performed by: INTERNAL MEDICINE

## 2020-10-09 PROCEDURE — 3078F PR MOST RECENT DIASTOLIC BLOOD PRESSURE < 80 MM HG: ICD-10-PCS | Mod: CPTII,S$GLB,, | Performed by: INTERNAL MEDICINE

## 2020-10-09 PROCEDURE — 99999 PR PBB SHADOW E&M-EST. PATIENT-LVL V: ICD-10-PCS | Mod: PBBFAC,,, | Performed by: INTERNAL MEDICINE

## 2020-10-09 PROCEDURE — 99499 UNLISTED E&M SERVICE: CPT | Mod: S$GLB,,, | Performed by: INTERNAL MEDICINE

## 2020-10-09 PROCEDURE — 99204 OFFICE O/P NEW MOD 45 MIN: CPT | Mod: S$GLB,,, | Performed by: INTERNAL MEDICINE

## 2020-10-09 NOTE — ASSESSMENT & PLAN NOTE
On chart review, only 1 recent potassium checked with level being 3.1 with normal sodium in GFR.  Currently with her over-the-counter potassium supplements and coconut water, she is likely taking less than the equivalent of 10 mEq of potassium daily and clinically does not seem like she has an endocrine reason to have hypokalemia.  I have asked her to stop all potassium supplements and decrease coconut water to 1 can per day.  Will repeat labs with BMP, renin, aldosterone (given history of hypertension) as well as magnesium due to history of muscle cramps.  I suspect that Potassium will be normal.  Okay to take low-dose (less than 250 mg) of magnesium nightly if having muscle cramping.

## 2020-10-09 NOTE — LETTER
October 9, 2020      Hannah Molina PA-C  [address]  [place] [address]           [facility] [address] [facility]  [address]  [facility] [address]  Phone: [phone]  Fax: [phone]          Patient: Nidhi Avendaño   MR Number: [id number]   YOB: 1970   Date of Visit: 10/9/2020       Dear Hannah Molina:    Thank you for referring Nidhi Avendaño to me for evaluation. Attached you will find relevant portions of my assessment and plan of care.    If you have questions, please do not hesitate to call me. I look forward to following Nidhi Avendaño along with you.    Sincerely,    Huey Rader MD    Enclosure  CC:  No Recipients    If you would like to receive this communication electronically, please contact [email or web address] or [phone] to request more information on [employer] access.    For providers and/or their staff who would like to refer a patient to [facility], please contact us through our one-stop-shop provider referral line, [facility], at [phone].    If you feel you have received this communication in error or would no longer like to receive these types of communications, please e-mail [email or web address]

## 2020-10-09 NOTE — PROGRESS NOTES
ENDOCRINOLOGY INITIAL VISIT  10/09/2020    Reason for Visit:  referred by Hannah Molina PA-C for evaluation of hypokalemia and hypertension.    HPI:  Ms. Ndihi Avendaño is a 49 y.o. female with problem list including Kirit's disease, anxiety/depression, opiate abuse, and hypertension who was referred by her primary care doctor for hypertension and intermittent hypokalemia.    Unclear why she has Kirit's disease medication as she does require any mineralocorticoid or glucocorticoid replacement.  She denies any previous history of adrenal insufficiency.  I suspect that she may have had transient secondary adrenal due to steroid use and she has never had primary adrenal insufficiency.  Previously on steroids for arthritis, had steroid injection 3 mo ago resulting in hypertension and she is not planning to get any further steroids. denies symptoms of adrenal insufficiency (no lightheadedness, N/V/abd pain, hypotension).       Regarding her history of hypokalemia, she reports that she was initially diagnosed with low potassium run 2012 and at that time was started on prescription potassium supplements.  She reports that as an outpatient, her potassium had been as low at 2.5 at [facility].  She also reported having some muscle cramps around that time.  Recently, she has been taking over-the-counter potassium supplements.    Started taking K supplements 1 year ago when having muscle cramps.  She is currently taking 2 of the 99 mg K tablets nightly (not FDA regulated but this would theoretically be equivalent to 5 mg once of potassium per day) and has been drinking 6-7 cans of coconut water daily.  She feels that the potassium supplementation prevents her from cramping up at night however she is not sure if the cramping sensation could also be related to the night terrors that she gets for which she takes Prazosin.    In the past she also tried taking magnesium for the cramping however found that it caused loose stools  so she stopped.  She is unsure what dose she was taking.    Only complaint today is recent development of hot flashes.  She has history of hysterectomy so has not been getting her periods for many years however ovaries were not removed.  She does not have symptoms of hyperthyroidism specifically denying I expected weight changes, changes to bowel habits, palpitations, tremor, skin/hair changes.    Regarding hypertension:  She reports being diagnosed with hypertension in her 40s.    Currently her only antihypertensive medications are amlodipine 10 mg daily and Prazosin 5 mg daily (for night terrors)  Home BP - not checking but has machine  She denies chest pain, shortness of breath, headaches, vision changes.    BP Readings from Last 3 Encounters:   10/09/20 128/78   09/01/20 138/76   08/10/20 (!) 144/82      Past Medical History:   Diagnosis Date    Asthma     Blood transfusion     Depression     Hypertension     Nervously anxious     Personal history of endometriosis     Strabismus     Uterus, adenomyosis        Past Surgical History:   Procedure Laterality Date    APPENDECTOMY      BLADDER SUSPENSION      BREAST BIOPSY      BREAST CYST EXCISION      CHOLECYSTECTOMY      HYSTERECTOMY      REIMPLANT URETER IN BLADDER      STRABISMUS SURGERY  06/05/13    mr daission [facility]       Review of patient's allergies indicates:   Allergen Reactions    Latex, natural rubber Anaphylaxis    Beta-blockers (beta-adrenergic blocking agts)     Compazine [prochlorperazine]     Latex     Phenergan [promethazine]     Thorazine [chlorpromazine]          Current Outpatient Medications:     albuterol (ACCUNEB) 0.63 mg/3 mL Nebu, Take 3 mLs (0.63 mg total) by nebulization every 6 (six) hours as needed., Disp: 1 Box, Rfl: 3    ALPRAZolam (XANAX) 0.25 MG tablet, Take 1 tablet (0.25 mg total) by mouth 2 (two) times daily as needed for Anxiety., Disp: 15 tablet, Rfl: 0    amLODIPine (NORVASC) 10 MG tablet, Take 1 tablet  (10 mg total) by mouth once daily., Disp: 90 tablet, Rfl: 3    buprenorphine-naloxone (SUBOXONE) 8-2 mg Film, , Disp: , Rfl:     buprenorphine-naloxone 8-2 mg (SUBOXONE) 8-2 mg Subl, buprenorphine 8 mg-naloxone 2 mg sublingual tablet, Disp: , Rfl:     chlorzoxazone (PARAFON FORTE) 500 mg Tab, , Disp: , Rfl:     clonidine (CATAPRES) 0.1 MG tablet, Take 0.1 mg by mouth 3 (three) times daily., Disp: , Rfl:     cyclobenzaprine (FLEXERIL) 10 MG tablet, , Disp: , Rfl:     diclofenac sodium (VOLTAREN) 1 % Gel, diclofenac 1 % topical gel, Disp: , Rfl:     famotidine (PEPCID) 40 MG tablet, Take 1 tablet (40 mg total) by mouth once daily., Disp: 30 tablet, Rfl: 2    gabapentin (NEURONTIN) 600 MG tablet, , Disp: , Rfl:     hydrOXYzine (ATARAX) 50 MG tablet, , Disp: , Rfl:     hydroxyzine HCL (ATARAX) 25 MG tablet, Take 1 tablet (25 mg total) by mouth 2 (two) times daily as needed for Itching or Anxiety. (Patient not taking: Reported on 7/13/2020), Disp: 60 tablet, Rfl: 2    hydrOXYzine HCL (ATARAX) 25 MG tablet, , Disp: , Rfl:     melatonin 1 mg/4 mL Drop, melatonin, Disp: , Rfl:     montelukast (SINGULAIR) 10 mg tablet, TAKE 1 TABLET BY MOUTH DAILY., Disp: 90 tablet, Rfl: 0    naloxone (NARCAN) 4 mg/actuation Spry, Narcan 4 mg/actuation nasal spray  LORI REP ALN, Disp: , Rfl:     prazosin (MINIPRESS) 2 MG Cap, Take 1 capsule (2 mg total) by mouth every evening., Disp: 90 capsule, Rfl: 3    prazosin (MINIPRESS) 2 MG Cap, , Disp: , Rfl:     prazosin (MINIPRESS) 5 MG capsule, TAKE 1 CAPSULE(5 MG) BY MOUTH EVERY EVENING, Disp: 30 capsule, Rfl: 0    SINGULAIR 10 mg tablet, , Disp: , Rfl:     tiZANidine (ZANAFLEX) 4 MG tablet, Take 4 mg by mouth 3 (three) times daily. , Disp: , Rfl:     tiZANidine (ZANAFLEX) 4 MG tablet, , Disp: , Rfl:     TREZIX 320.5-30-16 mg Cap, , Disp: , Rfl:     Social History     Socioeconomic History    Marital status:      Spouse name: Not on file    Number of children: Not on  "file    Years of education: Not on file    Highest education level: Not on file   Occupational History    Not on file   Social Needs    Financial resource strain: Not on file    Food insecurity     Worry: Not on file     Inability: Not on file    Transportation needs     Medical: Not on file     Non-medical: Not on file   Tobacco Use    Smoking status: Current Every Day Smoker     Packs/day: 1.00     Types: Cigarettes   Substance and Sexual Activity    Alcohol use: No    Drug use: Yes     Types: IV, Heroin     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    Sexual activity: Not Currently     Birth control/protection: Other-see comments     Comment: vaginal cuff   Lifestyle    Physical activity     Days per week: Not on file     Minutes per session: Not on file    Stress: Not on file   Relationships    Social connections     Talks on phone: Not on file     Gets together: Not on file     Attends Holiness service: Not on file     Active member of club or organization: Not on file     Attends meetings of clubs or organizations: Not on file     Relationship status: Not on file   Other Topics Concern    Not on file   Social History Narrative    Not on file       Family History   Problem Relation Age of Onset    Diabetes Mother     Thyroid disease Mother     Diabetes Maternal Grandmother     Glaucoma Maternal Grandmother     Hypertension Maternal Grandmother     Stroke Maternal Grandmother     Diabetes Maternal Grandfather     Glaucoma Maternal Grandfather     Hypertension Maternal Grandfather        REVIEW OF SYSTEMS  A 14 point ROS was obtained with pertinent positives and negatives per HPI in addition to positive for anxiety and chronic pain, all others negative.       PHYSICAL EXAM  /78 (BP Location: Right arm, Patient Position: Sitting, BP Method: Medium (Manual))   Pulse 87   Resp 16   Ht 5' 3" (1.6 m)   Wt 81 kg (178 lb 9.2 oz)   BMI 31.63 kg/m²   Wt Readings from Last 3 " Encounters:   10/09/20 81 kg (178 lb 9.2 oz)   09/01/20 87.9 kg (193 lb 12.6 oz)   08/10/20 84.3 kg (185 lb 13.6 oz)   ]    Constitutional:  Pleasant,  in no acute distress.   HENT:   Head:    Normocephalic and atraumatic.   Mouth/Throat:   Oropharynx is clear and moist. No oropharyngeal exudate.   Eyes:    EOMI. No scleral icterus.   Neck:    No thyromegaly or palpable thyroid nodules, no cervical LAD  Cardiovascular:  Normal rate, regular rhythm, 2+ radial pulses bilaterally  Respiratory:   Effort normal and breath sounds normal.   Gastrointestinal: Soft, nontender  Neurological:  No tremor, normal speech  Skin:    Skin is warm, dry  Psych:   Normal mood and affect.   Extremity:  No edema or wounds, no deformity    LABORATORY REVIEW:  Thyroid Labs Latest Ref Rng & Units 2/2/2013 2/20/2020 8/10/2020   TSH 0.4 - 4.0 uIU/ml - - -   Sodium 136 - 145 mmol/L 140 139 -   Potassium 3.5 - 5.1 mmol/L 3.6 3.1(L) -   Chloride 95 - 110 mmol/L 105 106 -   Carbon Dioxide 23 - 29 mmol/L 27 26 -   Glucose 70 - 110 mg/dL 128(H) 118(H) -   Blood Urea Nitrogen 6 - 20 mg/dL 22(H) 16 -   Creatinine 0.5 - 1.4 mg/dL 0.8 0.7 -   Calcium 8.7 - 10.5 mg/dL 8.9 9.0 -   Total Protein 6.0 - 8.4 g/dL 6.2 7.3 8.2   Albumin 3.5 - 5.2 g/dL 3.2(L) 4.1 3.9   Total Bilirubin 0.1 - 1.0 mg/dL 0.6 0.5 0.2   AST 10 - 40 U/L 10 15 16   ALT 10 - 44 U/L 13 18 16   Anion Gap 8 - 16 mmol/L 8.0 7(L) -   eGFR (African American) >60 mL/min/1.73 m:2 >60 >60 -   eGFR (Non-African American) >60 mL/min/1.73 m:2 >60 >60 -   WBC 4.8 - 10.8 K/uL 7.36 - -   RBC 4.00 - 5.40 M/uL 4.00 - -   Hemoglobin 12.0 - 16.0 gm/dl 11.4(L) - -   Hematocrit 37.0 - 48.5 % 35.3(L) - -   MCV 82 - 95 fL 88.3 - -   MCH 27 - 31 pg 28.5 - -   MCHC 32 - 36 % 32.3 - -   RDW 11.5 - 14.5 % 14.6(H) - -   Platelets 150 - 350 K/uL 295 - -   MPV 9.2 - 12.9 fL 10.5 - -   Gran # 1.8 - 7.7 K/uL 4.6 - -   Lymph # 1 - 4.8 K/uL 2.0 - -   Mono # 0.0 - 0.8 K/uL 0.6 - -   Eos # 0 - 0.45 K/uL 0.2 - -   Baso #  0.0 - 0.2 K/uL 0.0 - -   Gran % 38 - 73 % 62.4 - -   Lymph % 21 - 44 % 26.8 - -   Mono% 0.0 - 7.4 % 7.9(H) - -   Eos % 0.0 - 4.2 % 2.3 - -   Baso % 0 - 1.9 % 0.3 - -   Prothrombin Time 9.0 - 12.5 sec - - -   INR 0.8 - 1.2 - - -   aPTT 21.0 - 32.0 sec - - -        IMAGING STUDIES    ASSESSMENT/PLAN    Problem List Items Addressed This Visit        1 - High    Hypokalemia - Primary     On chart review, only 1 recent potassium checked with level being 3.1 with normal sodium in GFR.  Currently with her over-the-counter potassium supplements and coconut water, she is likely taking less than the equivalent of 10 mEq of potassium daily and clinically does not seem like she has an endocrine reason to have hypokalemia.  I have asked her to stop all potassium supplements and decrease coconut water to 1 can per day.  Will repeat labs with BMP, renin, aldosterone (given history of hypertension) as well as magnesium due to history of muscle cramps.  I suspect that Potassium will be normal.  Okay to take low-dose (less than 250 mg) of magnesium nightly if having muscle cramping.         Relevant Orders    Basic Metabolic Panel    Aldosterone    Renin    TSH    Magnesium       2     Hypertension     Blood pressure is well controlled in clinic on only Amlodipine and Prazosin which is not consistent with hyper aldosteronism which can sometimes cause hypokalemia.  Will confirm with renin and aldosterone levels.  She is not checking blood pressure at home which I encouraged her to do.            3     Hot flashes     No other symptoms of hyperthyroidism but will screen with TSH.  I think this is likely due to going through menopause.  She has an appointment scheduled later this month with her gyn, would not be unreasonable to check FSH to confirm.  Defer to GYN         Relevant Orders    TSH      Other Visit Diagnoses     Anxiety disorder, unspecified         Relevant Orders    TSH          RTC p.r.n. if labs abnormal    Huey SAM  MD Prasanth

## 2020-10-09 NOTE — ASSESSMENT & PLAN NOTE
Blood pressure is well controlled in clinic on only Amlodipine and Prazosin which is not consistent with hyper aldosteronism which can sometimes cause hypokalemia.  Will confirm with renin and aldosterone levels.  She is not checking blood pressure at home which I encouraged her to do.

## 2020-10-09 NOTE — PATIENT INSTRUCTIONS
Stop all potassium supplments and limit coconut water to 1 can per day.    Will repeat 8 am blood work in 2 weeks.

## 2020-10-09 NOTE — ASSESSMENT & PLAN NOTE
No other symptoms of hyperthyroidism but will screen with TSH.  I think this is likely due to going through menopause.  She has an appointment scheduled later this month with her gyn, would not be unreasonable to check FSH to confirm.  Defer to GYN

## 2020-10-26 ENCOUNTER — TELEPHONE (OUTPATIENT)
Dept: ENDOCRINOLOGY | Facility: CLINIC | Age: 50
End: 2020-10-26

## 2020-10-26 ENCOUNTER — LAB VISIT (OUTPATIENT)
Dept: LAB | Facility: OTHER | Age: 50
End: 2020-10-26
Attending: INTERNAL MEDICINE
Payer: MEDICARE

## 2020-10-26 DIAGNOSIS — E87.6 HYPOKALEMIA: ICD-10-CM

## 2020-10-26 DIAGNOSIS — R23.2 HOT FLASHES: ICD-10-CM

## 2020-10-26 DIAGNOSIS — F41.9 ANXIETY DISORDER, UNSPECIFIED: ICD-10-CM

## 2020-10-26 DIAGNOSIS — E87.6 HYPOKALEMIA: Primary | ICD-10-CM

## 2020-10-26 LAB
ANION GAP SERPL CALC-SCNC: 12 MMOL/L (ref 8–16)
BUN SERPL-MCNC: 11 MG/DL (ref 6–20)
CALCIUM SERPL-MCNC: 8.8 MG/DL (ref 8.7–10.5)
CHLORIDE SERPL-SCNC: 104 MMOL/L (ref 95–110)
CO2 SERPL-SCNC: 26 MMOL/L (ref 23–29)
CREAT SERPL-MCNC: 0.7 MG/DL (ref 0.5–1.4)
EST. GFR  (AFRICAN AMERICAN): >60 ML/MIN/1.73 M^2
EST. GFR  (NON AFRICAN AMERICAN): >60 ML/MIN/1.73 M^2
GLUCOSE SERPL-MCNC: 89 MG/DL (ref 70–110)
MAGNESIUM SERPL-MCNC: 1.6 MG/DL (ref 1.6–2.6)
POTASSIUM SERPL-SCNC: 2.7 MMOL/L (ref 3.5–5.1)
SODIUM SERPL-SCNC: 142 MMOL/L (ref 136–145)
TSH SERPL DL<=0.005 MIU/L-ACNC: 0.92 UIU/ML (ref 0.4–4)

## 2020-10-26 PROCEDURE — 36415 COLL VENOUS BLD VENIPUNCTURE: CPT

## 2020-10-26 PROCEDURE — 84443 ASSAY THYROID STIM HORMONE: CPT

## 2020-10-26 PROCEDURE — 84244 ASSAY OF RENIN: CPT

## 2020-10-26 PROCEDURE — 82088 ASSAY OF ALDOSTERONE: CPT

## 2020-10-26 PROCEDURE — 80048 BASIC METABOLIC PNL TOTAL CA: CPT

## 2020-10-26 PROCEDURE — 83735 ASSAY OF MAGNESIUM: CPT

## 2020-10-26 RX ORDER — POTASSIUM CHLORIDE 20 MEQ/1
20 TABLET, EXTENDED RELEASE ORAL 3 TIMES DAILY
Qty: 90 TABLET | Refills: 0 | Status: SHIPPED | OUTPATIENT
Start: 2020-10-26 | End: 2020-11-30 | Stop reason: SDUPTHER

## 2020-10-26 NOTE — TELEPHONE ENCOUNTER
Called patient to discuss that potassium was very low at 2.7.  She has been having some muscle cramps but no chest pain or palpitations.  I advised that she go to the ER for further evaluation with EKG and administration of IV potassium however she does not wish to do so at this time and in the past had severe pain with administration of IV potassium.  She understands that low potassium can result in life-threatening arrhythmia but prefers to take oral potassium supplementation and follow-up with levels tomorrow.    I have sent a prescription for potassium chloride 20 mEq tablets and have instructed her to take 1 as soon as she feels that from her pharmacy and then take another pill 4 hr later and then she will have BMP drawn tomorrow morning around 9:00 a.m. to see if levels are rising appropriately.  Tentatively will continue with 20 mEq t.i.d..    Renin and aldosterone are pending however hypokalemia may be a result of primary hyperaldosteronism.  Will hold off on starting spironolactone until results are available as we may need to do further workup and spironolactone may interfere with results.    1. Hypokalemia  - potassium chloride SA (K-DUR,KLOR-CON) 20 MEQ tablet; Take 1 tablet (20 mEq total) by mouth 3 (three) times daily.  Dispense: 90 tablet; Refill: 0  - Basic Metabolic Panel; Future     Huey Rader MD

## 2020-10-26 NOTE — TELEPHONE ENCOUNTER
----- Message from Era Davis sent at 10/26/2020 11:33 AM CDT -----  Regarding: Yoel Wei with [facility] Ph [phone] Direct number to Lab     Calling to report a Critical Lab on the patient .

## 2020-10-27 ENCOUNTER — LAB VISIT (OUTPATIENT)
Dept: LAB | Facility: OTHER | Age: 50
End: 2020-10-27
Attending: INTERNAL MEDICINE
Payer: MEDICARE

## 2020-10-27 ENCOUNTER — PATIENT MESSAGE (OUTPATIENT)
Dept: ENDOCRINOLOGY | Facility: CLINIC | Age: 50
End: 2020-10-27

## 2020-10-27 ENCOUNTER — TELEPHONE (OUTPATIENT)
Dept: ENDOCRINOLOGY | Facility: CLINIC | Age: 50
End: 2020-10-27

## 2020-10-27 DIAGNOSIS — E87.6 HYPOKALEMIA: ICD-10-CM

## 2020-10-27 LAB
ANION GAP SERPL CALC-SCNC: 10 MMOL/L (ref 8–16)
BUN SERPL-MCNC: 14 MG/DL (ref 6–20)
CALCIUM SERPL-MCNC: 9 MG/DL (ref 8.7–10.5)
CHLORIDE SERPL-SCNC: 104 MMOL/L (ref 95–110)
CO2 SERPL-SCNC: 28 MMOL/L (ref 23–29)
CREAT SERPL-MCNC: 0.8 MG/DL (ref 0.5–1.4)
EST. GFR  (AFRICAN AMERICAN): >60 ML/MIN/1.73 M^2
EST. GFR  (NON AFRICAN AMERICAN): >60 ML/MIN/1.73 M^2
GLUCOSE SERPL-MCNC: 126 MG/DL (ref 70–110)
POTASSIUM SERPL-SCNC: 3.1 MMOL/L (ref 3.5–5.1)
SODIUM SERPL-SCNC: 142 MMOL/L (ref 136–145)

## 2020-10-27 PROCEDURE — 80048 BASIC METABOLIC PNL TOTAL CA: CPT

## 2020-10-27 PROCEDURE — 36415 COLL VENOUS BLD VENIPUNCTURE: CPT

## 2020-10-27 NOTE — Clinical Note
Pls call patient and let her know potassium has improved, continue to take potassium pill 20 mEQ three times a day.  Pls schedule BMP in 2 days (any time of day non-fasting)

## 2020-10-27 NOTE — PROGRESS NOTES
K improved after 40 mEq KCl last night.  Will continue with 20 mEq TID and repeat BMP in 2 days.  Renin and ambrose still in process    1. Hypokalemia  - Basic Metabolic Panel  - Basic Metabolic Panel; Future    Huey Rader MD

## 2020-10-27 NOTE — TELEPHONE ENCOUNTER
----- Message from Huey Rader MD sent at 10/27/2020  2:37 PM CDT -----  Pls call patient and let her know potassium has improved, continue to take potassium pill 20 mEQ three times a day.  Pls schedule BMP in 2 days (any time of day non-fasting)

## 2020-10-28 LAB — RENIN PLAS-CCNC: 0.9 NG/ML/H

## 2020-10-29 LAB — ALDOST SERPL-MCNC: 33.5 NG/DL

## 2020-11-02 ENCOUNTER — PATIENT OUTREACH (OUTPATIENT)
Dept: ADMINISTRATIVE | Facility: OTHER | Age: 50
End: 2020-11-02

## 2020-11-02 ENCOUNTER — PATIENT MESSAGE (OUTPATIENT)
Dept: ENDOCRINOLOGY | Facility: CLINIC | Age: 50
End: 2020-11-02

## 2020-11-02 DIAGNOSIS — E26.9 HYPERALDOSTERONISM: Primary | ICD-10-CM

## 2020-11-02 NOTE — PROGRESS NOTES
Care Everywhere: updated  Immunization: no profile in links    Health Maintenance: updated  Media Review: review for outside colon cancer report   Legacy Review:   Order placed:   Upcoming appts  Gastroenterology referral 9/8/2020

## 2020-11-02 NOTE — TELEPHONE ENCOUNTER
Recent labs concerning for primary hyper aldosterone with elevated aldosterone to renin ratio and spontaneous hypokalemia.  Will check CT adrenal glands and repeat BMP on potassium supplementation.    1. Hyperaldosteronism  - CT Abdomen Pelvis W Wo Contrast; Future    Huey Rader MD

## 2020-11-04 ENCOUNTER — LAB VISIT (OUTPATIENT)
Dept: LAB | Facility: OTHER | Age: 50
End: 2020-11-04
Attending: INTERNAL MEDICINE
Payer: MEDICARE

## 2020-11-04 ENCOUNTER — OFFICE VISIT (OUTPATIENT)
Dept: OBSTETRICS AND GYNECOLOGY | Facility: CLINIC | Age: 50
End: 2020-11-04
Payer: MEDICARE

## 2020-11-04 VITALS
DIASTOLIC BLOOD PRESSURE: 98 MMHG | HEIGHT: 63 IN | BODY MASS INDEX: 32.86 KG/M2 | SYSTOLIC BLOOD PRESSURE: 160 MMHG | WEIGHT: 185.44 LBS

## 2020-11-04 DIAGNOSIS — E87.6 HYPOKALEMIA: ICD-10-CM

## 2020-11-04 DIAGNOSIS — R33.9 INCOMPLETE BLADDER EMPTYING: ICD-10-CM

## 2020-11-04 DIAGNOSIS — N81.6 RECTOCELE: ICD-10-CM

## 2020-11-04 DIAGNOSIS — Z01.419 ENCOUNTER FOR GYNECOLOGICAL EXAMINATION: Primary | ICD-10-CM

## 2020-11-04 DIAGNOSIS — N95.1 HOT FLASHES DUE TO MENOPAUSE: ICD-10-CM

## 2020-11-04 DIAGNOSIS — N81.4 CYSTOCELE WITH PROLAPSE: ICD-10-CM

## 2020-11-04 LAB
ANION GAP SERPL CALC-SCNC: 10 MMOL/L (ref 8–16)
BUN SERPL-MCNC: 19 MG/DL (ref 6–20)
CALCIUM SERPL-MCNC: 8.9 MG/DL (ref 8.7–10.5)
CHLORIDE SERPL-SCNC: 109 MMOL/L (ref 95–110)
CO2 SERPL-SCNC: 21 MMOL/L (ref 23–29)
CREAT SERPL-MCNC: 0.8 MG/DL (ref 0.5–1.4)
EST. GFR  (AFRICAN AMERICAN): >60 ML/MIN/1.73 M^2
EST. GFR  (NON AFRICAN AMERICAN): >60 ML/MIN/1.73 M^2
GLUCOSE SERPL-MCNC: 98 MG/DL (ref 70–110)
POTASSIUM SERPL-SCNC: 3.7 MMOL/L (ref 3.5–5.1)
SODIUM SERPL-SCNC: 140 MMOL/L (ref 136–145)

## 2020-11-04 PROCEDURE — 99999 PR PBB SHADOW E&M-EST. PATIENT-LVL IV: ICD-10-PCS | Mod: PBBFAC,,, | Performed by: OBSTETRICS & GYNECOLOGY

## 2020-11-04 PROCEDURE — 3080F PR MOST RECENT DIASTOLIC BLOOD PRESSURE >= 90 MM HG: ICD-10-PCS | Mod: CPTII,S$GLB,, | Performed by: OBSTETRICS & GYNECOLOGY

## 2020-11-04 PROCEDURE — 3008F BODY MASS INDEX DOCD: CPT | Mod: CPTII,S$GLB,, | Performed by: OBSTETRICS & GYNECOLOGY

## 2020-11-04 PROCEDURE — 3080F DIAST BP >= 90 MM HG: CPT | Mod: CPTII,S$GLB,, | Performed by: OBSTETRICS & GYNECOLOGY

## 2020-11-04 PROCEDURE — G0101 PR CA SCREEN;PELVIC/BREAST EXAM: ICD-10-PCS | Mod: S$GLB,,, | Performed by: OBSTETRICS & GYNECOLOGY

## 2020-11-04 PROCEDURE — G0101 CA SCREEN;PELVIC/BREAST EXAM: HCPCS | Mod: S$GLB,,, | Performed by: OBSTETRICS & GYNECOLOGY

## 2020-11-04 PROCEDURE — 3077F PR MOST RECENT SYSTOLIC BLOOD PRESSURE >= 140 MM HG: ICD-10-PCS | Mod: CPTII,S$GLB,, | Performed by: OBSTETRICS & GYNECOLOGY

## 2020-11-04 PROCEDURE — 80048 BASIC METABOLIC PNL TOTAL CA: CPT

## 2020-11-04 PROCEDURE — 36415 COLL VENOUS BLD VENIPUNCTURE: CPT

## 2020-11-04 PROCEDURE — 3008F PR BODY MASS INDEX (BMI) DOCUMENTED: ICD-10-PCS | Mod: CPTII,S$GLB,, | Performed by: OBSTETRICS & GYNECOLOGY

## 2020-11-04 PROCEDURE — 3077F SYST BP >= 140 MM HG: CPT | Mod: CPTII,S$GLB,, | Performed by: OBSTETRICS & GYNECOLOGY

## 2020-11-04 PROCEDURE — 99999 PR PBB SHADOW E&M-EST. PATIENT-LVL IV: CPT | Mod: PBBFAC,,, | Performed by: OBSTETRICS & GYNECOLOGY

## 2020-11-04 RX ORDER — SUVOREXANT 20 MG/1
TABLET, FILM COATED ORAL NIGHTLY
COMMUNITY
Start: 2020-10-22 | End: 2021-04-07 | Stop reason: SDUPTHER

## 2020-11-04 RX ORDER — ARIPIPRAZOLE 5 MG/1
TABLET ORAL
COMMUNITY
Start: 2020-10-17 | End: 2021-04-07

## 2020-11-04 RX ORDER — NAPROXEN 500 MG/1
500 TABLET ORAL
COMMUNITY
Start: 2020-11-02

## 2020-11-05 ENCOUNTER — TELEPHONE (OUTPATIENT)
Dept: FAMILY MEDICINE | Facility: CLINIC | Age: 50
End: 2020-11-05

## 2020-11-05 DIAGNOSIS — K59.00 CONSTIPATION, UNSPECIFIED CONSTIPATION TYPE: Primary | ICD-10-CM

## 2020-11-05 NOTE — PROGRESS NOTES
SUBJECTIVE:     Chief Complaint: Well Woman (Would like to discuss possible need of pessary)       History of Present Illness:  Annual Exam  Patient presents for annual exam.   She c/o vaginal prolapse, incomplete bladder emptying for many years today. Pt has a KAVIN in  for pelvic pain and was found to have adenomyosis. Has since had pelvic pain related to hip joint pain and has been focusing on tx for that but now states she is interested in treatment for her prolapse and bladder concerns. Denies RANDY. Has not been sexually active in 4 years due to concerns with her prolapse and would like to be sexually active again.   Also recently had hot flashes that went away with herbal supplements.   LMP: none, s/p hyst  She denies any vd, vb, dyspareunia, dysuria, depression, anxiety.  Last pap was in unsure and was neg prior to hyst.  Birth Control: hyst  Declines STD testing.     GYN screening history: denies abnl paps or stds  Mammogram history: neg   Colonoscopy history: scheduled  Dexa history: na    FH:   Breast cancer: none  Colon cancer: none  Ovarian cancer: none    Review of patient's allergies indicates:   Allergen Reactions    Latex, natural rubber Anaphylaxis    Beta-blockers (beta-adrenergic blocking agts)     Compazine [prochlorperazine]     Corticosteroids (glucocorticoids) Hallucinations     All Steroids    Latex     Phenergan [promethazine]     Thorazine [chlorpromazine]        Past Medical History:   Diagnosis Date    Asthma     Blood transfusion     Depression     Hypertension     Nervously anxious     Personal history of endometriosis     Strabismus     Uterus, adenomyosis      Past Surgical History:   Procedure Laterality Date    APPENDECTOMY      BLADDER SUSPENSION      BREAST BIOPSY      BREAST CYST EXCISION      CHOLECYSTECTOMY      HYSTERECTOMY      REIMPLANT URETER IN BLADDER      STRABISMUS SURGERY  13    mr recession OU     OB History        2    Para    2    Term   2            AB        Living           SAB        TAB        Ectopic        Multiple        Live Births                   Family History   Problem Relation Age of Onset    Diabetes Mother     Thyroid disease Mother     Diabetes Maternal Grandmother     Glaucoma Maternal Grandmother     Hypertension Maternal Grandmother     Stroke Maternal Grandmother     Diabetes Maternal Grandfather     Glaucoma Maternal Grandfather     Hypertension Maternal Grandfather      Social History     Tobacco Use    Smoking status: Current Every Day Smoker     Packs/day: 1.00     Types: Cigarettes   Substance Use Topics    Alcohol use: No    Drug use: Yes     Types: IV, Heroin     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       Current Outpatient Medications   Medication Sig    albuterol (ACCUNEB) 0.63 mg/3 mL Nebu Take 3 mLs (0.63 mg total) by nebulization every 6 (six) hours as needed.    amLODIPine (NORVASC) 10 MG tablet Take 1 tablet (10 mg total) by mouth once daily.    ARIPiprazole (ABILIFY) 5 MG Tab     BELSOMRA 10 mg Tab TK 1 T PO HS PRN    buprenorphine-naloxone (SUBOXONE) 8-2 mg Film     buprenorphine-naloxone 8-2 mg (SUBOXONE) 8-2 mg Subl buprenorphine 8 mg-naloxone 2 mg sublingual tablet    diclofenac sodium (VOLTAREN) 1 % Gel diclofenac 1 % topical gel    famotidine (PEPCID) 40 MG tablet Take 1 tablet (40 mg total) by mouth once daily.    gabapentin (NEURONTIN) 600 MG tablet     hydrOXYzine (ATARAX) 50 MG tablet     melatonin 1 mg/4 mL Drop melatonin    montelukast (SINGULAIR) 10 mg tablet TAKE 1 TABLET BY MOUTH DAILY.    naproxen (NAPROSYN) 500 MG tablet     potassium chloride SA (K-DUR,KLOR-CON) 20 MEQ tablet Take 1 tablet (20 mEq total) by mouth 3 (three) times daily.    prazosin (MINIPRESS) 5 MG capsule TAKE 1 CAPSULE(5 MG) BY MOUTH EVERY EVENING    tiZANidine (ZANAFLEX) 4 MG tablet      No current facility-administered medications for this visit.        Review  of Systems:  GENERAL: No fever, chills, fatigability or weight loss.  CARDIOVASCULAR: No chest pain. No palpitations.  RESPIRATORY: No SOB, no wheezing.  BREAST: Denies pain. No lumps. No discharge.  VULVAR: No pain, no lesions and no itching.  VAGINAL: No relaxation, no itching, no discharge, no abnormal bleeding and no lesions.  ABDOMEN: No abdominal pain. Denies nausea. Denies vomiting. No diarrhea. No constipation  URINARY: No incontinence, no nocturia, no frequency and no dysuria. + incomplete emptying at times.  NEUROLOGICAL: No headaches. No vision changes.       OBJECTIVE:     Vitals:    11/04/20 1557   BP: (!) 160/98       Physical Exam:  Gen: NAD, well developed, well-nourished  HEENT: Normocephalic, atraumatic  Eyes: EOM nl, conjuntivae normal  Neck: ROM normal, no thyromegaly  Respiratory: Effort normal   Abd: soft, nontender, no masses palpated  Breast: Normal bilaterally, no masses, lesions or tenderness. No nipple discharge on expression, no lymphadenopathy bilaterally.  SSE:  Vulva: no lesions or rashes  Cervix: surgically absent  + cystocele and rectocele noted on valsalva. No leakage of urine with valsalva. Appears to be stage I prolapse.   BME:   Cervix: absent  Adnexa: nl bilaterally, no masses or fullness palpated  Uterus:absent  Musculoskeletal: normal ROM  Neuro: alert, AAOx3  Skin: warm and dry  Psych: mood/affect nl, behavior normal, judgement normal, thought content normal        ASSESSMENT:       ICD-10-CM ICD-9-CM    1. Encounter for gynecological examination  Z01.419 V72.31 CANCELED: Liquid-Based Pap Smear, Screening      CANCELED: HPV High Risk Genotypes, PCR   2. Cystocele with prolapse  N81.4 618.4 Ambulatory referral/consult to Urogynecology   3. Rectocele  N81.6 618.04 Ambulatory referral/consult to Urogynecology   4. Incomplete bladder emptying  R33.9 788.21 Ambulatory referral/consult to Urogynecology   5. Hot flashes due to menopause  N95.1 627.2           Plan:      Nidhi  was seen today for well woman.    Diagnoses and all orders for this visit:    Encounter for gynecological examination  -     Cancel: Liquid-Based Pap Smear, Screening  -     Cancel: HPV High Risk Genotypes, PCR    Cystocele with prolapse  -     Ambulatory referral/consult to Urogynecology; Future    Rectocele  -     Ambulatory referral/consult to Urogynecology; Future    Incomplete bladder emptying  -     Ambulatory referral/consult to Urogynecology; Future    Hot flashes due to menopause        Orders Placed This Encounter   Procedures    Ambulatory referral/consult to Urogynecology     -Will refer to urogyn to discuss management of prolapse and symptoms.  -Discussed treatment for vasomotor symptoms including HRT and SSRIs. Pt would like to continue natural remedies for now and will let me know if she desires tx in the future.   Follow up in one year for annual, or prn.    Julie R Jeansonne

## 2020-11-05 NOTE — TELEPHONE ENCOUNTER
----- Message from Lizbeth Bush MA sent at 11/5/2020 10:29 AM CST -----  Regarding: COLONOSCOPY  Patient would like a referral to [facility].  Thank you.

## 2020-11-30 DIAGNOSIS — E87.6 HYPOKALEMIA: ICD-10-CM

## 2020-11-30 RX ORDER — POTASSIUM CHLORIDE 20 MEQ/1
20 TABLET, EXTENDED RELEASE ORAL 3 TIMES DAILY
Qty: 90 TABLET | Refills: 0 | Status: SHIPPED | OUTPATIENT
Start: 2020-11-30 | End: 2020-12-29

## 2020-12-01 DIAGNOSIS — F43.10 PTSD (POST-TRAUMATIC STRESS DISORDER): ICD-10-CM

## 2020-12-01 DIAGNOSIS — F51.4 NIGHT TERRORS: ICD-10-CM

## 2020-12-01 NOTE — TELEPHONE ENCOUNTER
----- Message from Blanca Willard sent at 11/30/2020  3:02 PM CST -----  Type: Patient Call Back    Who called:self    What is the request in detail:pt is out of prazosin (MINIPRESS) 5 MG capsule. She wants to know if she can get a refill    Can the clinic reply by [facility]?no    Would the patient rather a call back or a response via [facility]? call    Best call back number:

## 2020-12-02 RX ORDER — PRAZOSIN HYDROCHLORIDE 5 MG/1
CAPSULE ORAL
Qty: 90 CAPSULE | Refills: 0 | Status: SHIPPED | OUTPATIENT
Start: 2020-12-02 | End: 2021-04-07 | Stop reason: SDUPTHER

## 2020-12-03 ENCOUNTER — PATIENT OUTREACH (OUTPATIENT)
Dept: ADMINISTRATIVE | Facility: OTHER | Age: 50
End: 2020-12-03

## 2020-12-03 NOTE — PROGRESS NOTES
LINKS immunization registry not responding  Care Everywhere updated  Health Maintenance updated  Chart reviewed for overdue Proactive [facility] Encounters (YANLEI) health maintenance testing (CRS, Breast Ca, Diabetic Eye Exam)   Orders entered:N/A

## 2020-12-07 ENCOUNTER — OFFICE VISIT (OUTPATIENT)
Dept: GASTROENTEROLOGY | Facility: CLINIC | Age: 50
End: 2020-12-07
Payer: MEDICARE

## 2020-12-07 ENCOUNTER — TELEPHONE (OUTPATIENT)
Dept: UROGYNECOLOGY | Facility: CLINIC | Age: 50
End: 2020-12-07

## 2020-12-07 VITALS
SYSTOLIC BLOOD PRESSURE: 138 MMHG | HEART RATE: 80 BPM | DIASTOLIC BLOOD PRESSURE: 83 MMHG | BODY MASS INDEX: 32.86 KG/M2 | HEIGHT: 63 IN | WEIGHT: 185.44 LBS

## 2020-12-07 DIAGNOSIS — Z12.11 SPECIAL SCREENING FOR MALIGNANT NEOPLASMS, COLON: Primary | ICD-10-CM

## 2020-12-07 DIAGNOSIS — Z12.12 ENCOUNTER FOR SCREENING FOR COLORECTAL MALIGNANT NEOPLASM: Primary | ICD-10-CM

## 2020-12-07 DIAGNOSIS — Z12.11 ENCOUNTER FOR SCREENING FOR COLORECTAL MALIGNANT NEOPLASM: Primary | ICD-10-CM

## 2020-12-07 DIAGNOSIS — K59.00 CONSTIPATION, UNSPECIFIED CONSTIPATION TYPE: ICD-10-CM

## 2020-12-07 DIAGNOSIS — R19.5 CLAY-COLORED STOOLS: ICD-10-CM

## 2020-12-07 PROCEDURE — 99214 OFFICE O/P EST MOD 30 MIN: CPT | Mod: PBBFAC | Performed by: FAMILY MEDICINE

## 2020-12-07 PROCEDURE — 99999 PR PBB SHADOW E&M-EST. PATIENT-LVL IV: CPT | Mod: PBBFAC,,, | Performed by: FAMILY MEDICINE

## 2020-12-07 PROCEDURE — 99203 PR OFFICE/OUTPT VISIT, NEW, LEVL III, 30-44 MIN: ICD-10-PCS | Mod: S$GLB,,, | Performed by: FAMILY MEDICINE

## 2020-12-07 PROCEDURE — 99203 OFFICE O/P NEW LOW 30 MIN: CPT | Mod: S$GLB,,, | Performed by: FAMILY MEDICINE

## 2020-12-07 PROCEDURE — 99999 PR PBB SHADOW E&M-EST. PATIENT-LVL IV: ICD-10-PCS | Mod: PBBFAC,,, | Performed by: FAMILY MEDICINE

## 2020-12-07 RX ORDER — BACLOFEN 5 MG/1
TABLET ORAL
COMMUNITY
Start: 2020-12-01 | End: 2021-04-07

## 2020-12-07 NOTE — PATIENT INSTRUCTIONS
1. Please call the endoscopy schedulers to schedule scope at [phone].    2. Follow-up PRN for GI complaints

## 2020-12-07 NOTE — LETTER
December 7, 2020      Neal Galarza MD  [address] [place] [address]           [facility] 4th [address]  [address]  [facility] [address]  Phone: [phone]  Fax: [phone]          Patient: Nidhi Avendaño   MR Number: [id number]   YOB: 1970   Date of Visit: 12/7/2020       Dear Dr. Neal Galarza:    Thank you for referring Nidhi Avendaño to me for evaluation. Attached you will find relevant portions of my assessment and plan of care.    If you have questions, please do not hesitate to call me. I look forward to following Nidhi Avendaño along with you.    Sincerely,    Thania Hoyos, [facility]    Enclosure  CC:  No Recipients    If you would like to receive this communication electronically, please contact [email or web address] or [phone] to request more information on [facility] Link access.    For providers and/or their staff who would like to refer a patient to [facility], please contact us through our one-stop-shop provider referral line, [facility], at [phone].    If you feel you have received this communication in error or would no longer like to receive these types of communications, please e-mail [email or web address]

## 2020-12-07 NOTE — PROGRESS NOTES
[facility] Consultation Note    Reason for Consult:  The primary encounter diagnosis was Encounter for screening for colorectal malignant neoplasm. Diagnoses of Oneal-colored stools and Constipation, unspecified constipation type were also pertinent to this visit.    PCP:   Neal Galarza   [address] / [facility] [address]    Referring MD:  Neal Galarza Md  [address] [place] [address]    HPI:  This is a 50 y.o. female here for evaluation of abnormal colored stools. She is a new patient.  Reports onset of yellow stools x 1 month. She endorses that she was having uncontrolled anxiety at this time related to her career and how it's been affected by COVID pandemic. She reports this has completely resolved. She has normal, brown colored stools once daily. Denies blood in her stools.  Does have significant joint pains and takes multiple doses of NSAIDs daily. She has been on Suboxone strips as part of a pain management plan. She states she has seen pain management outside of [facility] before and their only recommendation other than Suboxone was steroid injections and she cannot tolerate these.  She also reports she is waiting for an appt with UROGYN regarding possibly getting a pessary but this isn't until January.  S/p Lap Heidy 2010.    Reflux - denies  Dysphagia - denies  Bowel Habits - denies diarrhea, denies constipation  Rectal Bleeding/Melena- denies  NSAIDs - multiple times daily for joint pains      ROS:  Constitutional: No fevers, chills, No unintentional weight loss  ENT: No allergies  CV: No chest pain  Pulm: No cough, No shortness of breath  Ophtho: No vision changes  GI: see HPI  Derm: No rash  Heme: No lymphadenopathy, No bruising, +ankle swelling (at night)  MSK: +bilateral hip pains, bilateral knee pains;  : No dysuria, No hematuria  Endo: No hot or cold intolerance  Neuro: No syncope, No seizure  Psych: No anxiety, No depression, +PTSD    Medical  History:  has a past medical history of Asthma, Blood transfusion, Depression, Hypertension, Nervously anxious, Personal history of endometriosis, Strabismus, and Uterus, adenomyosis.    Surgical History:  has a past surgical history that includes Cholecystectomy; Bladder suspension; Reimplant ureter in bladder; Hysterectomy; Appendectomy; Strabismus surgery (06/05/13); Breast cyst excision; and Breast biopsy.    Family History: family history includes Diabetes in her maternal grandfather, maternal grandmother, and mother; Glaucoma in her maternal grandfather and maternal grandmother; Hypertension in her maternal grandfather and maternal grandmother; Stroke in her maternal grandmother; Thyroid disease in her mother..     Social History:  reports that she has been smoking cigarettes. She has been smoking about 1.00 pack per day. She does not have any smokeless tobacco history on file. She reports current drug use. Drugs: IV and Heroin. She reports that she does not drink alcohol.    Review of patient's allergies indicates:   Allergen Reactions    Latex, natural rubber Anaphylaxis    Beta-blockers (beta-adrenergic blocking agts)     Compazine [prochlorperazine]     Corticosteroids (glucocorticoids) Hallucinations     All Steroids    Latex     Phenergan [promethazine]     Thorazine [chlorpromazine]        Current Outpatient Medications on File Prior to Visit   Medication Sig Dispense Refill    albuterol (ACCUNEB) 0.63 mg/3 mL Nebu Take 3 mLs (0.63 mg total) by nebulization every 6 (six) hours as needed. 1 Box 3    amLODIPine (NORVASC) 10 MG tablet Take 1 tablet (10 mg total) by mouth once daily. 90 tablet 3    ARIPiprazole (ABILIFY) 5 MG Tab       BELSOMRA 10 mg Tab TK 1 T PO HS PRN      buprenorphine-naloxone (SUBOXONE) 8-2 mg Film       buprenorphine-naloxone 8-2 mg (SUBOXONE) 8-2 mg Subl buprenorphine 8 mg-naloxone 2 mg sublingual tablet      diclofenac sodium (VOLTAREN) 1 % Gel diclofenac 1 % topical  "gel      famotidine (PEPCID) 40 MG tablet Take 1 tablet (40 mg total) by mouth once daily. 30 tablet 2    gabapentin (NEURONTIN) 600 MG tablet       hydrOXYzine (ATARAX) 50 MG tablet       melatonin 1 mg/4 mL Drop melatonin      montelukast (SINGULAIR) 10 mg tablet TAKE 1 TABLET BY MOUTH DAILY 90 tablet 0    naproxen (NAPROSYN) 500 MG tablet       potassium chloride SA (K-DUR,KLOR-CON) 20 MEQ tablet Take 1 tablet (20 mEq total) by mouth 3 (three) times daily. 90 tablet 0    prazosin (MINIPRESS) 5 MG capsule TAKE 1 CAPSULE(5 MG) BY MOUTH EVERY EVENING 90 capsule 0    tiZANidine (ZANAFLEX) 4 MG tablet       baclofen (LIORESAL) 5 mg Tab tablet        No current facility-administered medications on file prior to visit.          Objective Findings:    Vital Signs Reviewed:  /83   Pulse 80   Ht 5' 3" (1.6 m)   Wt 84.1 kg (185 lb 6.5 oz)   BMI 32.84 kg/m²   Body mass index is 32.84 kg/m².    Physical Exam:  General Appearance: Well appearing in no acute distress  Head:   Normocephalic, without obvious abnormality  Eyes:    No scleral icterus  ENT: Neck supple  Lungs: CTA bilaterally in anterior and posterior fields, diminished in lower fields;, no wheezes, no crackles.  Heart:  Regular rate and rhythm, S1, S2 normal, no murmurs heard  Abdomen: Soft, +epigastric tenderness, non distended with positive bowel sounds in all four quadrants. No hepatosplenomegaly, ascites, or mass  Extremities: No edema  Skin: No rash  Neurologic: AAO x 3      Labs Reviewed:  Lab Results   Component Value Date    WBC 7.36 02/02/2013    HGB 11.4 (L) 02/02/2013    HCT 35.3 (L) 02/02/2013     02/02/2013    CHOL 171 02/20/2020    TRIG 136 02/20/2020    HDL 56 02/20/2020    ALT 16 08/10/2020    AST 16 08/10/2020     11/04/2020    K 3.7 11/04/2020     11/04/2020    CREATININE 0.8 11/04/2020    BUN 19 11/04/2020    CO2 21 (L) 11/04/2020    INR 1.0 06/29/2012       Lab Results   Component Value Date    FERRITIN 63 " 02/01/2013    [facility] 300.0 02/01/2013          Imaging reviewed:  No prior abd imaging    Endoscopy reviewed:  No prior scopes    50 y.o. female here for evaluation of:    Assessment:  1. Encounter for screening for colorectal malignant neoplasm    2. Oneal-colored stools    3. Constipation, unspecified constipation type      Experienced Yellow-colored stools, notes this was closely corelated to uncontrolled anxiety/PTDS. This is now under control and her stool complaints have resolved. She is due for screening colonoscopy and is agreeable to have this ordered today. We did discuss the details of the procedure. Asked her to follow-up PRN if GI symptoms worsen or new symptoms develop. She is interested in a second opinion from our pain management providers - I will message her PCP regarding this. Will message UROGYN office regarding putting her on the waitlist for a sooner appt.    Recommendations:  1. Screening colon  2. Follow-up PRN      Order summary:  Orders Placed This Encounter    Case Request Endoscopy: COLONOSCOPY         Thank you so much for allowing me to participate in the care of RONY Morel

## 2020-12-07 NOTE — TELEPHONE ENCOUNTER
----- Message from Thania Hoyos DNP sent at 12/7/2020  1:06 PM CST -----  Regarding: Wait List for sooner appt  [place]!    I saw Ms Avendaño in GI clinic. She reported that she was hoping for a sooner appointment and I said I'd message Dr Morley's office to see about getting her on the wait list.    Please let me know if there is anything further I can do in this matter.    Thank you,  Thania

## 2021-02-22 DIAGNOSIS — E87.6 HYPOKALEMIA: ICD-10-CM

## 2021-02-22 RX ORDER — MONTELUKAST SODIUM 10 MG/1
TABLET ORAL
Qty: 90 TABLET | Refills: 0 | Status: SHIPPED | OUTPATIENT
Start: 2021-02-22 | End: 2021-03-24

## 2021-02-22 RX ORDER — POTASSIUM CHLORIDE 20 MEQ/1
TABLET, EXTENDED RELEASE ORAL
Qty: 270 TABLET | Refills: 0 | Status: SHIPPED | OUTPATIENT
Start: 2021-02-22 | End: 2021-07-20

## 2021-03-03 ENCOUNTER — PATIENT OUTREACH (OUTPATIENT)
Dept: ADMINISTRATIVE | Facility: OTHER | Age: 51
End: 2021-03-03

## 2021-03-03 DIAGNOSIS — Z12.31 ENCOUNTER FOR SCREENING MAMMOGRAM FOR MALIGNANT NEOPLASM OF BREAST: Primary | ICD-10-CM

## 2021-03-23 ENCOUNTER — TELEPHONE (OUTPATIENT)
Dept: FAMILY MEDICINE | Facility: CLINIC | Age: 51
End: 2021-03-23

## 2021-03-24 DIAGNOSIS — F41.9 ANXIETY: Primary | ICD-10-CM

## 2021-03-24 RX ORDER — HYDROXYZINE PAMOATE 50 MG/1
50 CAPSULE ORAL NIGHTLY
Qty: 30 CAPSULE | Refills: 0 | Status: SHIPPED | OUTPATIENT
Start: 2021-03-24 | End: 2021-04-07

## 2021-03-24 RX ORDER — HYDROXYZINE HYDROCHLORIDE 50 MG/1
TABLET, FILM COATED ORAL
OUTPATIENT
Start: 2021-03-24

## 2021-04-05 ENCOUNTER — PATIENT MESSAGE (OUTPATIENT)
Dept: ADMINISTRATIVE | Facility: HOSPITAL | Age: 51
End: 2021-04-05

## 2021-04-07 ENCOUNTER — OFFICE VISIT (OUTPATIENT)
Dept: FAMILY MEDICINE | Facility: CLINIC | Age: 51
End: 2021-04-07
Payer: MEDICARE

## 2021-04-07 VITALS
DIASTOLIC BLOOD PRESSURE: 80 MMHG | OXYGEN SATURATION: 95 % | SYSTOLIC BLOOD PRESSURE: 138 MMHG | RESPIRATION RATE: 16 BRPM | HEIGHT: 63 IN | WEIGHT: 185.44 LBS | BODY MASS INDEX: 32.86 KG/M2 | TEMPERATURE: 99 F | HEART RATE: 101 BPM

## 2021-04-07 DIAGNOSIS — F51.4 NIGHT TERRORS: ICD-10-CM

## 2021-04-07 DIAGNOSIS — I10 ESSENTIAL HYPERTENSION: ICD-10-CM

## 2021-04-07 DIAGNOSIS — F43.10 PTSD (POST-TRAUMATIC STRESS DISORDER): ICD-10-CM

## 2021-04-07 DIAGNOSIS — R79.9 ABNORMAL FINDING OF BLOOD CHEMISTRY, UNSPECIFIED: ICD-10-CM

## 2021-04-07 DIAGNOSIS — G89.29 CHRONIC MIDLINE LOW BACK PAIN WITHOUT SCIATICA: ICD-10-CM

## 2021-04-07 DIAGNOSIS — G47.00 INSOMNIA, UNSPECIFIED TYPE: Primary | ICD-10-CM

## 2021-04-07 DIAGNOSIS — M54.50 CHRONIC MIDLINE LOW BACK PAIN WITHOUT SCIATICA: ICD-10-CM

## 2021-04-07 PROCEDURE — 99499 RISK ADDL DX/OHS AUDIT: ICD-10-PCS | Mod: S$GLB,,, | Performed by: FAMILY MEDICINE

## 2021-04-07 PROCEDURE — 99215 OFFICE O/P EST HI 40 MIN: CPT | Mod: 25,S$GLB,, | Performed by: FAMILY MEDICINE

## 2021-04-07 PROCEDURE — 96372 THER/PROPH/DIAG INJ SC/IM: CPT | Mod: S$GLB,,, | Performed by: FAMILY MEDICINE

## 2021-04-07 PROCEDURE — 99499 UNLISTED E&M SERVICE: CPT | Mod: S$GLB,,, | Performed by: FAMILY MEDICINE

## 2021-04-07 PROCEDURE — 1125F PR PAIN SEVERITY QUANTIFIED, PAIN PRESENT: ICD-10-PCS | Mod: S$GLB,,, | Performed by: FAMILY MEDICINE

## 2021-04-07 PROCEDURE — 96372 PR INJECTION,THERAP/PROPH/DIAG2ST, IM OR SUBCUT: ICD-10-PCS | Mod: S$GLB,,, | Performed by: FAMILY MEDICINE

## 2021-04-07 PROCEDURE — 1125F AMNT PAIN NOTED PAIN PRSNT: CPT | Mod: S$GLB,,, | Performed by: FAMILY MEDICINE

## 2021-04-07 PROCEDURE — 99999 PR PBB SHADOW E&M-EST. PATIENT-LVL IV: CPT | Mod: PBBFAC,,, | Performed by: FAMILY MEDICINE

## 2021-04-07 PROCEDURE — 99999 PR PBB SHADOW E&M-EST. PATIENT-LVL IV: ICD-10-PCS | Mod: PBBFAC,,, | Performed by: FAMILY MEDICINE

## 2021-04-07 PROCEDURE — 99215 PR OFFICE/OUTPT VISIT, EST, LEVL V, 40-54 MIN: ICD-10-PCS | Mod: 25,S$GLB,, | Performed by: FAMILY MEDICINE

## 2021-04-07 RX ORDER — AMLODIPINE BESYLATE 10 MG/1
10 TABLET ORAL DAILY
Qty: 90 TABLET | Refills: 3 | Status: SHIPPED | OUTPATIENT
Start: 2021-04-07 | End: 2021-09-23 | Stop reason: SDUPTHER

## 2021-04-07 RX ORDER — SUVOREXANT 20 MG/1
1 TABLET, FILM COATED ORAL NIGHTLY
Qty: 30 TABLET | Refills: 2 | Status: SHIPPED | OUTPATIENT
Start: 2021-04-07 | End: 2021-07-21 | Stop reason: SDUPTHER

## 2021-04-07 RX ORDER — TIZANIDINE 4 MG/1
4 TABLET ORAL 3 TIMES DAILY
Qty: 90 TABLET | Refills: 2 | Status: SHIPPED | OUTPATIENT
Start: 2021-04-07 | End: 2021-10-26 | Stop reason: SDUPTHER

## 2021-04-07 RX ORDER — KETOROLAC TROMETHAMINE 30 MG/ML
60 INJECTION, SOLUTION INTRAMUSCULAR; INTRAVENOUS
Status: COMPLETED | OUTPATIENT
Start: 2021-04-07 | End: 2021-04-07

## 2021-04-07 RX ORDER — DICLOFENAC SODIUM 10 MG/G
GEL TOPICAL
Qty: 100 G | Refills: 3 | Status: SHIPPED | OUTPATIENT
Start: 2021-04-07

## 2021-04-07 RX ORDER — PRAZOSIN HYDROCHLORIDE 5 MG/1
10 CAPSULE ORAL NIGHTLY
Qty: 60 CAPSULE | Refills: 2 | Status: SHIPPED | OUTPATIENT
Start: 2021-04-07 | End: 2021-09-23 | Stop reason: SDUPTHER

## 2021-04-07 RX ORDER — HYDROXYZINE PAMOATE 25 MG/1
25 CAPSULE ORAL EVERY 8 HOURS PRN
Qty: 90 CAPSULE | Refills: 2 | Status: SHIPPED | OUTPATIENT
Start: 2021-04-07 | End: 2021-10-26 | Stop reason: SDUPTHER

## 2021-04-07 RX ADMIN — KETOROLAC TROMETHAMINE 60 MG: 30 INJECTION, SOLUTION INTRAMUSCULAR; INTRAVENOUS at 02:04

## 2021-04-08 ENCOUNTER — PATIENT OUTREACH (OUTPATIENT)
Dept: ADMINISTRATIVE | Facility: HOSPITAL | Age: 51
End: 2021-04-08

## 2021-04-16 ENCOUNTER — PATIENT MESSAGE (OUTPATIENT)
Dept: RESEARCH | Facility: HOSPITAL | Age: 51
End: 2021-04-16

## 2021-04-22 ENCOUNTER — PATIENT MESSAGE (OUTPATIENT)
Dept: UROLOGY | Facility: CLINIC | Age: 51
End: 2021-04-22

## 2021-04-23 ENCOUNTER — PATIENT OUTREACH (OUTPATIENT)
Dept: ADMINISTRATIVE | Facility: HOSPITAL | Age: 51
End: 2021-04-23

## 2021-05-18 ENCOUNTER — TELEPHONE (OUTPATIENT)
Dept: FAMILY MEDICINE | Facility: CLINIC | Age: 51
End: 2021-05-18

## 2021-05-18 ENCOUNTER — NURSE TRIAGE (OUTPATIENT)
Dept: ADMINISTRATIVE | Facility: CLINIC | Age: 51
End: 2021-05-18

## 2021-05-19 RX ORDER — ALBUTEROL SULFATE 0.63 MG/3ML
0.63 SOLUTION RESPIRATORY (INHALATION) EVERY 6 HOURS PRN
Qty: 1 BOX | Refills: 0 | OUTPATIENT
Start: 2021-05-19 | End: 2021-05-21

## 2021-05-21 RX ORDER — ALBUTEROL SULFATE 0.63 MG/3ML
0.63 SOLUTION RESPIRATORY (INHALATION) EVERY 6 HOURS PRN
Qty: 1 BOX | Refills: 0 | Status: SHIPPED | OUTPATIENT
Start: 2021-05-21 | End: 2021-05-26 | Stop reason: SDUPTHER

## 2021-05-24 ENCOUNTER — TELEPHONE (OUTPATIENT)
Dept: FAMILY MEDICINE | Facility: CLINIC | Age: 51
End: 2021-05-24

## 2021-05-24 DIAGNOSIS — J20.9 ACUTE BRONCHITIS, UNSPECIFIED ORGANISM: Primary | ICD-10-CM

## 2021-05-26 RX ORDER — ALBUTEROL SULFATE 0.63 MG/3ML
0.63 SOLUTION RESPIRATORY (INHALATION) EVERY 6 HOURS PRN
Qty: 1 BOX | Refills: 0 | Status: SHIPPED | OUTPATIENT
Start: 2021-05-26 | End: 2021-12-01 | Stop reason: SDUPTHER

## 2021-06-07 DIAGNOSIS — E87.6 HYPOKALEMIA: ICD-10-CM

## 2021-06-07 RX ORDER — ALBUTEROL SULFATE 90 UG/1
AEROSOL, METERED RESPIRATORY (INHALATION)
COMMUNITY
Start: 2021-01-10

## 2021-06-09 ENCOUNTER — LAB VISIT (OUTPATIENT)
Dept: LAB | Facility: HOSPITAL | Age: 51
End: 2021-06-09
Attending: FAMILY MEDICINE
Payer: MEDICARE

## 2021-06-09 ENCOUNTER — OFFICE VISIT (OUTPATIENT)
Dept: FAMILY MEDICINE | Facility: CLINIC | Age: 51
End: 2021-06-09
Payer: MEDICARE

## 2021-06-09 VITALS
SYSTOLIC BLOOD PRESSURE: 144 MMHG | DIASTOLIC BLOOD PRESSURE: 88 MMHG | BODY MASS INDEX: 33.43 KG/M2 | WEIGHT: 188.69 LBS | RESPIRATION RATE: 20 BRPM | HEART RATE: 97 BPM | HEIGHT: 63 IN | OXYGEN SATURATION: 97 % | TEMPERATURE: 99 F

## 2021-06-09 DIAGNOSIS — R15.9 INCONTINENCE OF FECES, UNSPECIFIED FECAL INCONTINENCE TYPE: ICD-10-CM

## 2021-06-09 DIAGNOSIS — I10 ESSENTIAL HYPERTENSION: ICD-10-CM

## 2021-06-09 DIAGNOSIS — N81.4 CYSTOCELE WITH PROLAPSE: ICD-10-CM

## 2021-06-09 DIAGNOSIS — Z00.00 ANNUAL PHYSICAL EXAM: Primary | ICD-10-CM

## 2021-06-09 DIAGNOSIS — R79.9 ABNORMAL FINDING OF BLOOD CHEMISTRY, UNSPECIFIED: ICD-10-CM

## 2021-06-09 DIAGNOSIS — N81.6 RECTOCELE: ICD-10-CM

## 2021-06-09 LAB
ALBUMIN SERPL BCP-MCNC: 3.9 G/DL (ref 3.5–5.2)
ALP SERPL-CCNC: 114 U/L (ref 55–135)
ALT SERPL W/O P-5'-P-CCNC: 9 U/L (ref 10–44)
ANION GAP SERPL CALC-SCNC: 14 MMOL/L (ref 8–16)
AST SERPL-CCNC: 18 U/L (ref 10–40)
BASOPHILS # BLD AUTO: 0.06 K/UL (ref 0–0.2)
BASOPHILS NFR BLD: 0.5 % (ref 0–1.9)
BILIRUB SERPL-MCNC: 0.6 MG/DL (ref 0.1–1)
BUN SERPL-MCNC: 11 MG/DL (ref 6–20)
CALCIUM SERPL-MCNC: 9 MG/DL (ref 8.7–10.5)
CHLORIDE SERPL-SCNC: 103 MMOL/L (ref 95–110)
CHOLEST SERPL-MCNC: 172 MG/DL (ref 120–199)
CHOLEST/HDLC SERPL: 3.4 {RATIO} (ref 2–5)
CO2 SERPL-SCNC: 22 MMOL/L (ref 23–29)
CREAT SERPL-MCNC: 0.8 MG/DL (ref 0.5–1.4)
DIFFERENTIAL METHOD: ABNORMAL
EOSINOPHIL # BLD AUTO: 0.3 K/UL (ref 0–0.5)
EOSINOPHIL NFR BLD: 2.6 % (ref 0–8)
ERYTHROCYTE [DISTWIDTH] IN BLOOD BY AUTOMATED COUNT: 14.2 % (ref 11.5–14.5)
EST. GFR  (AFRICAN AMERICAN): >60 ML/MIN/1.73 M^2
EST. GFR  (NON AFRICAN AMERICAN): >60 ML/MIN/1.73 M^2
ESTIMATED AVG GLUCOSE: 105 MG/DL (ref 68–131)
GLUCOSE SERPL-MCNC: 89 MG/DL (ref 70–110)
HBA1C MFR BLD: 5.3 % (ref 4–5.6)
HCT VFR BLD AUTO: 37.1 % (ref 37–48.5)
HDLC SERPL-MCNC: 51 MG/DL (ref 40–75)
HDLC SERPL: 29.7 % (ref 20–50)
HGB BLD-MCNC: 11.8 G/DL (ref 12–16)
IMM GRANULOCYTES # BLD AUTO: 0.03 K/UL (ref 0–0.04)
IMM GRANULOCYTES NFR BLD AUTO: 0.3 % (ref 0–0.5)
LDLC SERPL CALC-MCNC: 84 MG/DL (ref 63–159)
LYMPHOCYTES # BLD AUTO: 1.3 K/UL (ref 1–4.8)
LYMPHOCYTES NFR BLD: 11.1 % (ref 18–48)
MCH RBC QN AUTO: 28.8 PG (ref 27–31)
MCHC RBC AUTO-ENTMCNC: 31.8 G/DL (ref 32–36)
MCV RBC AUTO: 91 FL (ref 82–98)
MONOCYTES # BLD AUTO: 0.6 K/UL (ref 0.3–1)
MONOCYTES NFR BLD: 5.1 % (ref 4–15)
NEUTROPHILS # BLD AUTO: 9.3 K/UL (ref 1.8–7.7)
NEUTROPHILS NFR BLD: 80.4 % (ref 38–73)
NONHDLC SERPL-MCNC: 121 MG/DL
NRBC BLD-RTO: 0 /100 WBC
PLATELET # BLD AUTO: 358 K/UL (ref 150–450)
PMV BLD AUTO: 11.8 FL (ref 9.2–12.9)
POTASSIUM SERPL-SCNC: 3.6 MMOL/L (ref 3.5–5.1)
PROT SERPL-MCNC: 7.7 G/DL (ref 6–8.4)
RBC # BLD AUTO: 4.1 M/UL (ref 4–5.4)
SODIUM SERPL-SCNC: 139 MMOL/L (ref 136–145)
TRIGL SERPL-MCNC: 185 MG/DL (ref 30–150)
TSH SERPL DL<=0.005 MIU/L-ACNC: 1.44 UIU/ML (ref 0.4–4)
WBC # BLD AUTO: 11.61 K/UL (ref 3.9–12.7)

## 2021-06-09 PROCEDURE — 1126F PR PAIN SEVERITY QUANTIFIED, NO PAIN PRESENT: ICD-10-PCS | Mod: S$GLB,,, | Performed by: FAMILY MEDICINE

## 2021-06-09 PROCEDURE — 85025 COMPLETE CBC W/AUTO DIFF WBC: CPT | Performed by: FAMILY MEDICINE

## 2021-06-09 PROCEDURE — 1126F AMNT PAIN NOTED NONE PRSNT: CPT | Mod: S$GLB,,, | Performed by: FAMILY MEDICINE

## 2021-06-09 PROCEDURE — 99999 PR PBB SHADOW E&M-EST. PATIENT-LVL III: CPT | Mod: PBBFAC,,, | Performed by: FAMILY MEDICINE

## 2021-06-09 PROCEDURE — 83036 HEMOGLOBIN GLYCOSYLATED A1C: CPT | Performed by: FAMILY MEDICINE

## 2021-06-09 PROCEDURE — 80061 LIPID PANEL: CPT | Performed by: FAMILY MEDICINE

## 2021-06-09 PROCEDURE — 80053 COMPREHEN METABOLIC PANEL: CPT | Performed by: FAMILY MEDICINE

## 2021-06-09 PROCEDURE — 3008F PR BODY MASS INDEX (BMI) DOCUMENTED: ICD-10-PCS | Mod: CPTII,S$GLB,, | Performed by: FAMILY MEDICINE

## 2021-06-09 PROCEDURE — 3008F BODY MASS INDEX DOCD: CPT | Mod: CPTII,S$GLB,, | Performed by: FAMILY MEDICINE

## 2021-06-09 PROCEDURE — 99999 PR PBB SHADOW E&M-EST. PATIENT-LVL III: ICD-10-PCS | Mod: PBBFAC,,, | Performed by: FAMILY MEDICINE

## 2021-06-09 PROCEDURE — 36415 COLL VENOUS BLD VENIPUNCTURE: CPT | Mod: PO | Performed by: FAMILY MEDICINE

## 2021-06-09 PROCEDURE — 99214 PR OFFICE/OUTPT VISIT, EST, LEVL IV, 30-39 MIN: ICD-10-PCS | Mod: S$GLB,,, | Performed by: FAMILY MEDICINE

## 2021-06-09 PROCEDURE — 99214 OFFICE O/P EST MOD 30 MIN: CPT | Mod: S$GLB,,, | Performed by: FAMILY MEDICINE

## 2021-06-09 PROCEDURE — 84443 ASSAY THYROID STIM HORMONE: CPT | Performed by: FAMILY MEDICINE

## 2021-06-16 RX ORDER — ALBUTEROL SULFATE 90 UG/1
AEROSOL, METERED RESPIRATORY (INHALATION)
Qty: 18 G | OUTPATIENT
Start: 2021-06-16

## 2021-06-16 RX ORDER — POTASSIUM CHLORIDE 20 MEQ/1
TABLET, EXTENDED RELEASE ORAL
Qty: 270 TABLET | Refills: 0 | OUTPATIENT
Start: 2021-06-16

## 2021-06-17 ENCOUNTER — TELEPHONE (OUTPATIENT)
Dept: FAMILY MEDICINE | Facility: CLINIC | Age: 51
End: 2021-06-17

## 2021-06-28 ENCOUNTER — TELEPHONE (OUTPATIENT)
Dept: FAMILY MEDICINE | Facility: CLINIC | Age: 51
End: 2021-06-28

## 2021-06-29 ENCOUNTER — PATIENT OUTREACH (OUTPATIENT)
Dept: ADMINISTRATIVE | Facility: HOSPITAL | Age: 51
End: 2021-06-29

## 2021-07-06 ENCOUNTER — PATIENT MESSAGE (OUTPATIENT)
Dept: ADMINISTRATIVE | Facility: HOSPITAL | Age: 51
End: 2021-07-06

## 2021-07-07 ENCOUNTER — HOSPITAL ENCOUNTER (EMERGENCY)
Facility: OTHER | Age: 51
Discharge: HOME | End: 2021-07-07
Attending: EMERGENCY MEDICINE
Payer: MEDICARE

## 2021-07-07 VITALS
RESPIRATION RATE: 18 BRPM | TEMPERATURE: 99 F | SYSTOLIC BLOOD PRESSURE: 178 MMHG | DIASTOLIC BLOOD PRESSURE: 87 MMHG | OXYGEN SATURATION: 99 % | HEART RATE: 59 BPM

## 2021-07-07 DIAGNOSIS — G89.29 CHRONIC BILATERAL LOW BACK PAIN WITH BILATERAL SCIATICA: Primary | ICD-10-CM

## 2021-07-07 DIAGNOSIS — M54.41 CHRONIC BILATERAL LOW BACK PAIN WITH BILATERAL SCIATICA: Primary | ICD-10-CM

## 2021-07-07 DIAGNOSIS — M54.42 CHRONIC BILATERAL LOW BACK PAIN WITH BILATERAL SCIATICA: Primary | ICD-10-CM

## 2021-07-07 PROCEDURE — 99284 EMERGENCY DEPT VISIT MOD MDM: CPT | Mod: 25

## 2021-07-07 PROCEDURE — 96372 THER/PROPH/DIAG INJ SC/IM: CPT

## 2021-07-07 PROCEDURE — 63600175 PHARM REV CODE 636 W HCPCS: Performed by: EMERGENCY MEDICINE

## 2021-07-07 RX ORDER — FENTANYL CITRATE 50 UG/ML
50 INJECTION, SOLUTION INTRAMUSCULAR; INTRAVENOUS ONCE
Status: COMPLETED | OUTPATIENT
Start: 2021-07-07 | End: 2021-07-07

## 2021-07-07 RX ADMIN — FENTANYL CITRATE 50 MCG: 50 INJECTION, SOLUTION INTRAMUSCULAR; INTRAVENOUS at 02:07

## 2021-07-13 ENCOUNTER — TELEPHONE (OUTPATIENT)
Dept: NEUROSURGERY | Facility: CLINIC | Age: 51
End: 2021-07-13

## 2021-07-14 ENCOUNTER — TELEPHONE (OUTPATIENT)
Dept: NEUROSURGERY | Facility: CLINIC | Age: 51
End: 2021-07-14

## 2021-07-14 DIAGNOSIS — G47.00 INSOMNIA, UNSPECIFIED TYPE: ICD-10-CM

## 2021-07-14 RX ORDER — SUVOREXANT 20 MG/1
1 TABLET, FILM COATED ORAL NIGHTLY
Qty: 30 TABLET | Refills: 2 | Status: CANCELLED | OUTPATIENT
Start: 2021-07-14

## 2021-07-15 ENCOUNTER — HOSPITAL ENCOUNTER (EMERGENCY)
Facility: HOSPITAL | Age: 51
Discharge: HOME | End: 2021-07-15
Attending: EMERGENCY MEDICINE
Payer: MEDICARE

## 2021-07-15 ENCOUNTER — HOSPITAL ENCOUNTER (EMERGENCY)
Facility: OTHER | Age: 51
Discharge: HOME | End: 2021-07-15
Attending: EMERGENCY MEDICINE
Payer: MEDICARE

## 2021-07-15 VITALS
OXYGEN SATURATION: 98 % | TEMPERATURE: 98 F | HEART RATE: 86 BPM | DIASTOLIC BLOOD PRESSURE: 91 MMHG | BODY MASS INDEX: 32.78 KG/M2 | HEIGHT: 63 IN | SYSTOLIC BLOOD PRESSURE: 198 MMHG | WEIGHT: 185 LBS | RESPIRATION RATE: 18 BRPM

## 2021-07-15 VITALS
DIASTOLIC BLOOD PRESSURE: 96 MMHG | BODY MASS INDEX: 32.78 KG/M2 | SYSTOLIC BLOOD PRESSURE: 173 MMHG | HEIGHT: 63 IN | TEMPERATURE: 99 F | HEART RATE: 77 BPM | OXYGEN SATURATION: 99 % | WEIGHT: 185 LBS | RESPIRATION RATE: 18 BRPM

## 2021-07-15 DIAGNOSIS — M54.41 BILATERAL LOW BACK PAIN WITH BILATERAL SCIATICA, UNSPECIFIED CHRONICITY: Primary | ICD-10-CM

## 2021-07-15 DIAGNOSIS — G89.29 CHRONIC BILATERAL BACK PAIN, UNSPECIFIED BACK LOCATION: ICD-10-CM

## 2021-07-15 DIAGNOSIS — M54.42 BILATERAL LOW BACK PAIN WITH BILATERAL SCIATICA, UNSPECIFIED CHRONICITY: Primary | ICD-10-CM

## 2021-07-15 DIAGNOSIS — G62.9 NEUROPATHY: Primary | ICD-10-CM

## 2021-07-15 DIAGNOSIS — M54.9 CHRONIC BILATERAL BACK PAIN, UNSPECIFIED BACK LOCATION: ICD-10-CM
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
## 2021-07-15 PROCEDURE — 99284 EMERGENCY DEPT VISIT MOD MDM: CPT | Mod: 25,27

## 2021-07-15 PROCEDURE — 96375 TX/PRO/DX INJ NEW DRUG ADDON: CPT

## 2021-07-15 PROCEDURE — 86803 HEPATITIS C AB TEST: CPT | Performed by: EMERGENCY MEDICINE

## 2021-07-15 PROCEDURE — 63600175 PHARM REV CODE 636 W HCPCS: Performed by: PHYSICIAN ASSISTANT

## 2021-07-15 PROCEDURE — 51798 US URINE CAPACITY MEASURE: CPT

## 2021-07-15 PROCEDURE — 99285 EMERGENCY DEPT VISIT HI MDM: CPT | Mod: ,,, | Performed by: PHYSICIAN ASSISTANT

## 2021-07-15 PROCEDURE — 99281 EMR DPT VST MAYX REQ PHY/QHP: CPT | Mod: 25

## 2021-07-15 PROCEDURE — 99285 PR EMERGENCY DEPT VISIT,LEVEL V: ICD-10-PCS | Mod: ,,, | Performed by: PHYSICIAN ASSISTANT

## 2021-07-15 PROCEDURE — 96374 THER/PROPH/DIAG INJ IV PUSH: CPT

## 2021-07-15 PROCEDURE — 25000003 PHARM REV CODE 250: Performed by: PHYSICIAN ASSISTANT

## 2021-07-15 RX ORDER — KETOROLAC TROMETHAMINE 30 MG/ML
10 INJECTION, SOLUTION INTRAMUSCULAR; INTRAVENOUS
Status: COMPLETED | OUTPATIENT
Start: 2021-07-15 | End: 2021-07-15

## 2021-07-15 RX ORDER — OXYCODONE HYDROCHLORIDE 5 MG/1
10 TABLET ORAL
Status: DISCONTINUED | OUTPATIENT
Start: 2021-07-15 | End: 2021-07-15

## 2021-07-15 RX ORDER — AMLODIPINE BESYLATE 5 MG/1
10 TABLET ORAL ONCE
Status: DISCONTINUED | OUTPATIENT
Start: 2021-07-15 | End: 2021-07-15 | Stop reason: HOSPADM

## 2021-07-15 RX ORDER — FENTANYL CITRATE 50 UG/ML
50 INJECTION, SOLUTION INTRAMUSCULAR; INTRAVENOUS
Status: COMPLETED | OUTPATIENT
Start: 2021-07-15 | End: 2021-07-15

## 2021-07-15 RX ORDER — DIAZEPAM 10 MG/2ML
5 INJECTION INTRAMUSCULAR
Status: COMPLETED | OUTPATIENT
Start: 2021-07-15 | End: 2021-07-15

## 2021-07-15 RX ADMIN — FENTANYL CITRATE 50 MCG: 50 INJECTION INTRAMUSCULAR; INTRAVENOUS at 11:07

## 2021-07-15 RX ADMIN — KETOROLAC TROMETHAMINE 10 MG: 30 INJECTION, SOLUTION INTRAMUSCULAR; INTRAVENOUS at 10:07

## 2021-07-15 RX ADMIN — DIAZEPAM 5 MG: 5 INJECTION, SOLUTION INTRAMUSCULAR; INTRAVENOUS at 09:07

## 2021-07-16 ENCOUNTER — TELEPHONE (OUTPATIENT)
Dept: PAIN MEDICINE | Facility: CLINIC | Age: 51
End: 2021-07-16

## 2021-07-16 LAB — HCV AB SERPL QL IA: NEGATIVE

## 2021-07-20 ENCOUNTER — TELEPHONE (OUTPATIENT)
Dept: FAMILY MEDICINE | Facility: CLINIC | Age: 51
End: 2021-07-20

## 2021-07-20 ENCOUNTER — TELEPHONE (OUTPATIENT)
Dept: NEUROSURGERY | Facility: CLINIC | Age: 51
End: 2021-07-20

## 2021-07-20 DIAGNOSIS — G47.00 INSOMNIA, UNSPECIFIED TYPE: ICD-10-CM

## 2021-07-21 RX ORDER — SUVOREXANT 20 MG/1
1 TABLET, FILM COATED ORAL NIGHTLY
Qty: 30 TABLET | Refills: 0 | Status: SHIPPED | OUTPATIENT
Start: 2021-07-21 | End: 2021-09-28 | Stop reason: SDUPTHER

## 2021-07-26 ENCOUNTER — OFFICE VISIT (OUTPATIENT)
Dept: NEUROSURGERY | Facility: CLINIC | Age: 51
End: 2021-07-26
Payer: MEDICARE

## 2021-07-26 VITALS
HEIGHT: 63 IN | SYSTOLIC BLOOD PRESSURE: 151 MMHG | DIASTOLIC BLOOD PRESSURE: 87 MMHG | BODY MASS INDEX: 32.96 KG/M2 | WEIGHT: 186 LBS | HEART RATE: 91 BPM

## 2021-07-26 DIAGNOSIS — M54.42 ACUTE BILATERAL LOW BACK PAIN WITH BILATERAL SCIATICA: ICD-10-CM

## 2021-07-26 DIAGNOSIS — M54.41 ACUTE BILATERAL LOW BACK PAIN WITH BILATERAL SCIATICA: ICD-10-CM

## 2021-07-26 PROBLEM — M54.50 LUMBAGO: Status: ACTIVE | Noted: 2021-07-26

## 2021-07-26 PROCEDURE — 1125F AMNT PAIN NOTED PAIN PRSNT: CPT | Mod: CPTII,S$GLB,, | Performed by: NEUROLOGICAL SURGERY

## 2021-07-26 PROCEDURE — 1160F PR REVIEW ALL MEDS BY PRESCRIBER/CLIN PHARMACIST DOCUMENTED: ICD-10-PCS | Mod: CPTII,S$GLB,, | Performed by: NEUROLOGICAL SURGERY

## 2021-07-26 PROCEDURE — 3077F SYST BP >= 140 MM HG: CPT | Mod: CPTII,S$GLB,, | Performed by: NEUROLOGICAL SURGERY

## 2021-07-26 PROCEDURE — 99204 OFFICE O/P NEW MOD 45 MIN: CPT | Mod: S$GLB,,, | Performed by: NEUROLOGICAL SURGERY

## 2021-07-26 PROCEDURE — 1160F RVW MEDS BY RX/DR IN RCRD: CPT | Mod: CPTII,S$GLB,, | Performed by: NEUROLOGICAL SURGERY

## 2021-07-26 PROCEDURE — 1125F PR PAIN SEVERITY QUANTIFIED, PAIN PRESENT: ICD-10-PCS | Mod: CPTII,S$GLB,, | Performed by: NEUROLOGICAL SURGERY

## 2021-07-26 PROCEDURE — 99204 PR OFFICE/OUTPT VISIT, NEW, LEVL IV, 45-59 MIN: ICD-10-PCS | Mod: S$GLB,,, | Performed by: NEUROLOGICAL SURGERY

## 2021-07-26 PROCEDURE — 3008F BODY MASS INDEX DOCD: CPT | Mod: CPTII,S$GLB,, | Performed by: NEUROLOGICAL SURGERY

## 2021-07-26 PROCEDURE — 99999 PR PBB SHADOW E&M-EST. PATIENT-LVL IV: CPT | Mod: PBBFAC,,, | Performed by: NEUROLOGICAL SURGERY

## 2021-07-26 PROCEDURE — 3079F PR MOST RECENT DIASTOLIC BLOOD PRESSURE 80-89 MM HG: ICD-10-PCS | Mod: CPTII,S$GLB,, | Performed by: NEUROLOGICAL SURGERY

## 2021-07-26 PROCEDURE — 3079F DIAST BP 80-89 MM HG: CPT | Mod: CPTII,S$GLB,, | Performed by: NEUROLOGICAL SURGERY

## 2021-07-26 PROCEDURE — 1159F PR MEDICATION LIST DOCUMENTED IN MEDICAL RECORD: ICD-10-PCS | Mod: CPTII,S$GLB,, | Performed by: NEUROLOGICAL SURGERY

## 2021-07-26 PROCEDURE — 99999 PR PBB SHADOW E&M-EST. PATIENT-LVL IV: ICD-10-PCS | Mod: PBBFAC,,, | Performed by: NEUROLOGICAL SURGERY

## 2021-07-26 PROCEDURE — 1159F MED LIST DOCD IN RCRD: CPT | Mod: CPTII,S$GLB,, | Performed by: NEUROLOGICAL SURGERY

## 2021-07-26 PROCEDURE — 3077F PR MOST RECENT SYSTOLIC BLOOD PRESSURE >= 140 MM HG: ICD-10-PCS | Mod: CPTII,S$GLB,, | Performed by: NEUROLOGICAL SURGERY

## 2021-07-26 PROCEDURE — 3008F PR BODY MASS INDEX (BMI) DOCUMENTED: ICD-10-PCS | Mod: CPTII,S$GLB,, | Performed by: NEUROLOGICAL SURGERY

## 2021-07-26 RX ORDER — METHOCARBAMOL 750 MG/1
750 TABLET, FILM COATED ORAL
COMMUNITY
Start: 2021-06-11 | End: 2021-10-29

## 2021-07-26 RX ORDER — MELOXICAM 15 MG/1
15 TABLET ORAL
COMMUNITY
Start: 2021-06-11

## 2021-07-29 ENCOUNTER — TELEPHONE (OUTPATIENT)
Dept: NEUROSURGERY | Facility: CLINIC | Age: 51
End: 2021-07-29

## 2021-08-05 ENCOUNTER — TELEPHONE (OUTPATIENT)
Dept: PAIN MEDICINE | Facility: CLINIC | Age: 51
End: 2021-08-05

## 2021-08-11 ENCOUNTER — PATIENT OUTREACH (OUTPATIENT)
Dept: ADMINISTRATIVE | Facility: HOSPITAL | Age: 51
End: 2021-08-11

## 2021-08-17 ENCOUNTER — PATIENT OUTREACH (OUTPATIENT)
Dept: ADMINISTRATIVE | Facility: OTHER | Age: 51
End: 2021-08-17

## 2021-09-03 ENCOUNTER — PATIENT MESSAGE (OUTPATIENT)
Dept: NEUROSURGERY | Facility: CLINIC | Age: 51
End: 2021-09-03

## 2021-09-23 DIAGNOSIS — F51.4 NIGHT TERRORS: ICD-10-CM

## 2021-09-23 DIAGNOSIS — F43.10 PTSD (POST-TRAUMATIC STRESS DISORDER): ICD-10-CM

## 2021-09-23 DIAGNOSIS — I10 ESSENTIAL HYPERTENSION: ICD-10-CM

## 2021-09-23 RX ORDER — ALPRAZOLAM 1 MG/1
1 TABLET ORAL 2 TIMES DAILY
Qty: 60 TABLET | Refills: 2 | OUTPATIENT
Start: 2021-09-23

## 2021-09-23 RX ORDER — PRAZOSIN HYDROCHLORIDE 5 MG/1
10 CAPSULE ORAL NIGHTLY
Qty: 60 CAPSULE | Refills: 2 | Status: SHIPPED | OUTPATIENT
Start: 2021-09-23 | End: 2021-11-15

## 2021-09-23 RX ORDER — AMLODIPINE BESYLATE 10 MG/1
10 TABLET ORAL DAILY
Qty: 90 TABLET | Refills: 0 | Status: SHIPPED | OUTPATIENT
Start: 2021-09-23 | End: 2022-02-03 | Stop reason: SDUPTHER

## 2021-09-28 DIAGNOSIS — F43.10 PTSD (POST-TRAUMATIC STRESS DISORDER): ICD-10-CM

## 2021-09-28 DIAGNOSIS — F51.4 NIGHT TERRORS: ICD-10-CM

## 2021-09-28 DIAGNOSIS — G47.00 INSOMNIA, UNSPECIFIED TYPE: ICD-10-CM

## 2021-09-28 DIAGNOSIS — M54.50 CHRONIC MIDLINE LOW BACK PAIN WITHOUT SCIATICA: ICD-10-CM

## 2021-09-28 DIAGNOSIS — G89.29 CHRONIC MIDLINE LOW BACK PAIN WITHOUT SCIATICA: ICD-10-CM

## 2021-09-29 RX ORDER — ALPRAZOLAM 0.25 MG/1
0.25 TABLET ORAL DAILY PRN
Qty: 30 TABLET | Refills: 0 | Status: SHIPPED | OUTPATIENT
Start: 2021-09-29 | End: 2021-10-29

## 2021-09-29 RX ORDER — SUVOREXANT 20 MG/1
1 TABLET, FILM COATED ORAL NIGHTLY
Qty: 30 TABLET | Refills: 0 | Status: SHIPPED | OUTPATIENT
Start: 2021-09-29 | End: 2021-10-26 | Stop reason: SDUPTHER

## 2021-09-29 RX ORDER — HYDROXYZINE PAMOATE 25 MG/1
25 CAPSULE ORAL EVERY 8 HOURS PRN
Qty: 90 CAPSULE | Refills: 2 | Status: CANCELLED | OUTPATIENT
Start: 2021-09-29

## 2021-09-29 RX ORDER — TIZANIDINE 4 MG/1
4 TABLET ORAL 3 TIMES DAILY
Qty: 90 TABLET | Refills: 2 | Status: CANCELLED | OUTPATIENT
Start: 2021-09-29

## 2021-10-04 ENCOUNTER — PATIENT MESSAGE (OUTPATIENT)
Dept: ADMINISTRATIVE | Facility: HOSPITAL | Age: 51
End: 2021-10-04

## 2021-10-26 DIAGNOSIS — G89.29 CHRONIC MIDLINE LOW BACK PAIN WITHOUT SCIATICA: ICD-10-CM

## 2021-10-26 DIAGNOSIS — F51.4 NIGHT TERRORS: ICD-10-CM

## 2021-10-26 DIAGNOSIS — G47.00 INSOMNIA, UNSPECIFIED TYPE: ICD-10-CM

## 2021-10-26 DIAGNOSIS — M54.50 CHRONIC MIDLINE LOW BACK PAIN WITHOUT SCIATICA: ICD-10-CM

## 2021-10-26 DIAGNOSIS — F43.10 PTSD (POST-TRAUMATIC STRESS DISORDER): ICD-10-CM

## 2021-10-27 ENCOUNTER — TELEPHONE (OUTPATIENT)
Dept: FAMILY MEDICINE | Facility: CLINIC | Age: 51
End: 2021-10-27
Payer: MEDICARE

## 2021-10-29 RX ORDER — SUVOREXANT 20 MG/1
1 TABLET, FILM COATED ORAL NIGHTLY
Qty: 30 TABLET | Refills: 0 | Status: SHIPPED | OUTPATIENT
Start: 2021-10-29 | End: 2021-12-07 | Stop reason: SDUPTHER

## 2021-10-29 RX ORDER — TIZANIDINE 4 MG/1
4 TABLET ORAL 3 TIMES DAILY
Qty: 90 TABLET | Refills: 2 | Status: SHIPPED | OUTPATIENT
Start: 2021-10-29 | End: 2021-12-01 | Stop reason: SDUPTHER

## 2021-10-29 RX ORDER — HYDROXYZINE PAMOATE 25 MG/1
25 CAPSULE ORAL EVERY 8 HOURS PRN
Qty: 90 CAPSULE | Refills: 2 | Status: SHIPPED | OUTPATIENT
Start: 2021-10-29 | End: 2021-12-01 | Stop reason: SDUPTHER

## 2021-11-23 ENCOUNTER — PATIENT OUTREACH (OUTPATIENT)
Dept: ADMINISTRATIVE | Facility: HOSPITAL | Age: 51
End: 2021-11-23
Payer: MEDICARE

## 2021-11-24 ENCOUNTER — PES CALL (OUTPATIENT)
Dept: ADMINISTRATIVE | Facility: CLINIC | Age: 51
End: 2021-11-24
Payer: MEDICARE

## 2021-11-30 ENCOUNTER — TELEPHONE (OUTPATIENT)
Dept: ADMINISTRATIVE | Facility: CLINIC | Age: 51
End: 2021-11-30
Payer: MEDICARE

## 2021-12-01 DIAGNOSIS — G89.29 CHRONIC MIDLINE LOW BACK PAIN WITHOUT SCIATICA: ICD-10-CM

## 2021-12-01 DIAGNOSIS — M54.50 CHRONIC MIDLINE LOW BACK PAIN WITHOUT SCIATICA: ICD-10-CM

## 2021-12-01 DIAGNOSIS — F51.4 NIGHT TERRORS: ICD-10-CM

## 2021-12-01 DIAGNOSIS — J20.9 ACUTE BRONCHITIS, UNSPECIFIED ORGANISM: ICD-10-CM

## 2021-12-01 DIAGNOSIS — F43.10 PTSD (POST-TRAUMATIC STRESS DISORDER): ICD-10-CM

## 2021-12-01 RX ORDER — PRAZOSIN HYDROCHLORIDE 5 MG/1
10 CAPSULE ORAL NIGHTLY
Qty: 60 CAPSULE | Refills: 2 | Status: CANCELLED | OUTPATIENT
Start: 2021-12-01

## 2021-12-01 RX ORDER — TIZANIDINE 4 MG/1
4 TABLET ORAL 3 TIMES DAILY
Qty: 90 TABLET | Refills: 2 | Status: CANCELLED | OUTPATIENT
Start: 2021-12-01

## 2021-12-02 ENCOUNTER — TELEPHONE (OUTPATIENT)
Dept: ADMINISTRATIVE | Facility: CLINIC | Age: 51
End: 2021-12-02
Payer: MEDICARE

## 2021-12-03 ENCOUNTER — TELEPHONE (OUTPATIENT)
Dept: ADMINISTRATIVE | Facility: CLINIC | Age: 51
End: 2021-12-03
Payer: MEDICARE

## 2021-12-03 RX ORDER — HYDROXYZINE PAMOATE 25 MG/1
25 CAPSULE ORAL EVERY 8 HOURS PRN
Qty: 90 CAPSULE | Refills: 2 | Status: SHIPPED | OUTPATIENT
Start: 2021-12-03

## 2021-12-03 RX ORDER — ALBUTEROL SULFATE 0.63 MG/3ML
0.63 SOLUTION RESPIRATORY (INHALATION) EVERY 6 HOURS PRN
Qty: 1 EACH | Refills: 0 | Status: SHIPPED | OUTPATIENT
Start: 2021-12-03

## 2021-12-03 RX ORDER — TIZANIDINE 4 MG/1
4 TABLET ORAL 3 TIMES DAILY
Qty: 90 TABLET | Refills: 2 | Status: SHIPPED | OUTPATIENT
Start: 2021-12-03 | End: 2022-02-03 | Stop reason: SDUPTHER

## 2021-12-07 DIAGNOSIS — G47.00 INSOMNIA, UNSPECIFIED TYPE: ICD-10-CM

## 2021-12-08 ENCOUNTER — PATIENT MESSAGE (OUTPATIENT)
Dept: ADMINISTRATIVE | Facility: HOSPITAL | Age: 51
End: 2021-12-08
Payer: MEDICARE

## 2021-12-10 ENCOUNTER — TELEPHONE (OUTPATIENT)
Dept: FAMILY MEDICINE | Facility: CLINIC | Age: 51
End: 2021-12-10
Payer: MEDICARE

## 2021-12-10 RX ORDER — SUVOREXANT 20 MG/1
1 TABLET, FILM COATED ORAL NIGHTLY
Qty: 30 TABLET | Refills: 0 | Status: SHIPPED | OUTPATIENT
Start: 2021-12-10 | End: 2022-01-27 | Stop reason: SDUPTHER

## 2021-12-15 ENCOUNTER — PATIENT MESSAGE (OUTPATIENT)
Dept: ADMINISTRATIVE | Facility: HOSPITAL | Age: 51
End: 2021-12-15
Payer: MEDICARE

## 2022-01-13 DIAGNOSIS — G47.00 INSOMNIA, UNSPECIFIED TYPE: ICD-10-CM

## 2022-01-13 RX ORDER — SUVOREXANT 20 MG/1
1 TABLET, FILM COATED ORAL NIGHTLY
Qty: 30 TABLET | Refills: 0 | OUTPATIENT
Start: 2022-01-13

## 2022-01-13 NOTE — TELEPHONE ENCOUNTER
----- Message from Era Padilla sent at 1/13/2022 10:32 AM CST -----  Contact: Patient [phone]  Type: RX Refill Request    Who Called: Patient     Have you contacted your pharmacy: No    Refill or New Rx: Refill     RX Name and Strength: BELSOMRA 20 mg Tab    Is this a 30 day or 90 day RX: 90 day    Preferred Pharmacy with phone number: .  [id number] - [place], [place] - [address]  [address]  [facility] [address]  Phone: [phone] Fax: [phone]    Local or Mail Order: Local    Would the patient rather a call back or a response via [facility]? Call back    Best Call Back Number: [phone]    Additional Information: Patient is scheduled to see a new Psychiatrist on 03-14-22, which was the 1st available. Would like to know if she can get a refill until she can get in with new Psychiatrist. States her old Psychiatrist left the clinic. Please call pt in regards to this.

## 2022-01-20 RX ORDER — SUVOREXANT 20 MG/1
1 TABLET, FILM COATED ORAL NIGHTLY
Qty: 30 TABLET | Refills: 0 | Status: CANCELLED | OUTPATIENT
Start: 2022-01-20

## 2022-01-20 NOTE — TELEPHONE ENCOUNTER
Pt state she cannot wait until next appt for refill. State she hasn't been to sleep in 4 days. State she went to er and was told she cannot be helped and can't see the psychiatrist until March . appt scheduled for 1/27.

## 2022-01-21 DIAGNOSIS — G47.00 INSOMNIA, UNSPECIFIED TYPE: ICD-10-CM

## 2022-01-21 RX ORDER — SUVOREXANT 20 MG/1
1 TABLET, FILM COATED ORAL NIGHTLY
Qty: 30 TABLET | Refills: 0 | Status: CANCELLED | OUTPATIENT
Start: 2022-01-21

## 2022-01-21 NOTE — TELEPHONE ENCOUNTER
----- Message from Era Padilla sent at 1/21/2022  8:37 AM CST -----  Contact: Patient [phone]  Type: RX Refill Request    Who Called: Patient     Have you contacted your pharmacy: No. Need to get refill on medication until her appointment on 01-27-22. Patient states she need something to calm her down and help her sleep. Please call in meds and call pt once this is done.     Refill or New Rx: Refill    RX Name and Strength: BELSOMRA 20 mg Tab    Is this a 30 day or 90 day RX: 90 day    Preferred Pharmacy with phone number: .    [id number] - [place], [place] - [address]  [address] [facility] [address]  Phone: [phone] Fax: [phone]    Local or Mail Order: Local    Would the patient rather a call back or a response via [facility]? Call back    Best Call Back Number: [phone]    Additional Information: Patient states that she's in need of her sleeping medication. Would like to know if she can get something sent in to pharmacy until she can get in for appointment on 01-27-22? Please call in refill ASAP and call pt once this is done.

## 2022-01-21 NOTE — TELEPHONE ENCOUNTER
----- Message from Kiley Jayna sent at 1/21/2022  4:13 PM CST -----  Regarding: self  [phone]  .Type: RX Refill Request    Who Called: self     Have you contacted your pharmacy: yes    Refill or New Rx: refill     RX Name and Strength: BELSOMRA 20 mg Tab    Preferred Pharmacy with phone number: .  [id number] - [place], [place] - [address] AT [address]  [address] [facility] [place] [address]  Phone: [phone] Fax: [phone]    Local or Mail Order: local     Would the patient rather a call back or a response via [facility]?  Call back     Best Call Back Number: [phone]    Additional Info: Pt needs rx filled asap

## 2022-01-24 ENCOUNTER — TELEPHONE (OUTPATIENT)
Dept: FAMILY MEDICINE | Facility: CLINIC | Age: 52
End: 2022-01-24
Payer: MEDICARE

## 2022-01-24 NOTE — TELEPHONE ENCOUNTER
Pt stated that she is out of medication for her insomnia requesting medication until visit----- Message from Nirali Rachel sent at 1/24/2022  1:09 PM CST -----  Type: Patient Call Back    Who called: Self     What is the request in detail: patient says she is having a lot of trouble with insomnia , since she has been out of meds, she would like to know if she can get enough to last until her appt on 1/27. Please call     Can the clinic reply by [facility]? No     Would the patient rather a call back or a response via [facility]? Call     Best call back [phone]       [id number] [place], [place] - [facility] [address]   Phone:  [phone]  Fax:  [phone]

## 2022-01-25 ENCOUNTER — TELEPHONE (OUTPATIENT)
Dept: FAMILY MEDICINE | Facility: CLINIC | Age: 52
End: 2022-01-25
Payer: MEDICARE

## 2022-01-25 NOTE — TELEPHONE ENCOUNTER
----- Message from Corazon Araceli sent at 1/25/2022  1:25 PM CST -----  Contact: ROSS GRADY [[id number]]  Type: Call Back    Who called:ROSS GRADY [[id number]]    What is the request in detail: Patient is requesting a call back. She is calling to check the status of her BELSOMRA 20 mg Tab refill request. Please advise.     Can the clinic reply by [facility]? No    Would the patient rather a call back or a response via [facility]? Call back     Best call back number: [phone] (mobile)    Additional Information:

## 2022-01-26 ENCOUNTER — TELEPHONE (OUTPATIENT)
Dept: FAMILY MEDICINE | Facility: CLINIC | Age: 52
End: 2022-01-26
Payer: MEDICARE

## 2022-01-26 NOTE — TELEPHONE ENCOUNTER
----- Message from Nidia Henriquez sent at 1/26/2022  1:52 PM CST -----  Regarding: medication  Name of Who is Calling: ROSS GRADY [[id number]]      What is the request in detail: Patient is requesting a call back asap she states she needs her BELSOMRA 20 mg Tab medication and no one has filled it or even call her back       Can the clinic reply by [facility]: no      What Number to Call Back if not in [facility]: [phone]

## 2022-01-27 ENCOUNTER — PATIENT MESSAGE (OUTPATIENT)
Dept: FAMILY MEDICINE | Facility: CLINIC | Age: 52
End: 2022-01-27

## 2022-01-27 ENCOUNTER — OFFICE VISIT (OUTPATIENT)
Dept: FAMILY MEDICINE | Facility: CLINIC | Age: 52
End: 2022-01-27
Payer: MEDICARE

## 2022-01-27 ENCOUNTER — TELEPHONE (OUTPATIENT)
Dept: FAMILY MEDICINE | Facility: CLINIC | Age: 52
End: 2022-01-27
Payer: MEDICARE

## 2022-01-27 VITALS
TEMPERATURE: 100 F | BODY MASS INDEX: 30.46 KG/M2 | RESPIRATION RATE: 20 BRPM | WEIGHT: 171.94 LBS | SYSTOLIC BLOOD PRESSURE: 170 MMHG | DIASTOLIC BLOOD PRESSURE: 91 MMHG | HEIGHT: 63 IN | OXYGEN SATURATION: 98 % | HEART RATE: 101 BPM

## 2022-01-27 DIAGNOSIS — M54.50 CHRONIC MIDLINE LOW BACK PAIN WITHOUT SCIATICA: ICD-10-CM

## 2022-01-27 DIAGNOSIS — F51.4 NIGHT TERRORS: ICD-10-CM

## 2022-01-27 DIAGNOSIS — G47.00 INSOMNIA, UNSPECIFIED TYPE: ICD-10-CM

## 2022-01-27 DIAGNOSIS — G89.29 CHRONIC MIDLINE LOW BACK PAIN WITHOUT SCIATICA: ICD-10-CM

## 2022-01-27 DIAGNOSIS — F43.10 PTSD (POST-TRAUMATIC STRESS DISORDER): ICD-10-CM

## 2022-01-27 DIAGNOSIS — E87.6 HYPOKALEMIA: ICD-10-CM

## 2022-01-27 DIAGNOSIS — I10 ESSENTIAL HYPERTENSION: ICD-10-CM

## 2022-01-27 PROCEDURE — 3077F SYST BP >= 140 MM HG: CPT | Mod: CPTII,S$GLB,, | Performed by: FAMILY MEDICINE

## 2022-01-27 PROCEDURE — 3008F BODY MASS INDEX DOCD: CPT | Mod: CPTII,S$GLB,, | Performed by: FAMILY MEDICINE

## 2022-01-27 PROCEDURE — 3080F DIAST BP >= 90 MM HG: CPT | Mod: CPTII,S$GLB,, | Performed by: FAMILY MEDICINE

## 2022-01-27 PROCEDURE — 99999 PR PBB SHADOW E&M-EST. PATIENT-LVL III: CPT | Mod: PBBFAC,,, | Performed by: FAMILY MEDICINE

## 2022-01-27 PROCEDURE — 99215 PR OFFICE/OUTPT VISIT, EST, LEVL V, 40-54 MIN: ICD-10-PCS | Mod: S$GLB,,, | Performed by: FAMILY MEDICINE

## 2022-01-27 PROCEDURE — 3077F PR MOST RECENT SYSTOLIC BLOOD PRESSURE >= 140 MM HG: ICD-10-PCS | Mod: CPTII,S$GLB,, | Performed by: FAMILY MEDICINE

## 2022-01-27 PROCEDURE — 3008F PR BODY MASS INDEX (BMI) DOCUMENTED: ICD-10-PCS | Mod: CPTII,S$GLB,, | Performed by: FAMILY MEDICINE

## 2022-01-27 PROCEDURE — 99215 OFFICE O/P EST HI 40 MIN: CPT | Mod: S$GLB,,, | Performed by: FAMILY MEDICINE

## 2022-01-27 PROCEDURE — 99999 PR PBB SHADOW E&M-EST. PATIENT-LVL III: ICD-10-PCS | Mod: PBBFAC,,, | Performed by: FAMILY MEDICINE

## 2022-01-27 PROCEDURE — 3080F PR MOST RECENT DIASTOLIC BLOOD PRESSURE >= 90 MM HG: ICD-10-PCS | Mod: CPTII,S$GLB,, | Performed by: FAMILY MEDICINE

## 2022-01-27 RX ORDER — SUVOREXANT 20 MG/1
1 TABLET, FILM COATED ORAL NIGHTLY
Qty: 30 TABLET | Refills: 2 | Status: CANCELLED | OUTPATIENT
Start: 2022-01-27

## 2022-01-27 RX ORDER — SUVOREXANT 20 MG/1
1 TABLET, FILM COATED ORAL NIGHTLY
Qty: 30 TABLET | Refills: 2 | Status: SHIPPED | OUTPATIENT
Start: 2022-01-27 | End: 2022-01-28 | Stop reason: SDUPTHER

## 2022-01-27 NOTE — TELEPHONE ENCOUNTER
----- Message from Nazanin Livingston sent at 1/27/2022 12:28 PM CST -----  Regarding: Self [phone]  Type: Patient Call Back    Who called:self    What is the request in detail:ernst stated karla was out of the medication and she needs it called to [facility] ASAP.     Can the clinic reply by [facility]?  no    Would the patient rather a call back or a response via [facility]? Call back    Best call back number: [phone]          [facility] [place], [place] - [address]  [address]  [facility] [address]  Phone: [phone] Fax: [phone]

## 2022-01-27 NOTE — PROGRESS NOTES
Subjective:       Patient ID: Nidhi Avendaño is a 51 y.o. female.    Chief Complaint: Medication Refill      HPI  51-year-old female presents for insomnia.  Would like refills on her medication.  Patient states she has a follow-up with Psychiatry in 2 months time.  Feels anxiety has worsened.  States she has personal history which has been causing her anxiety.  Patient was tearful during interview process.      Review of Systems   Constitutional: Negative.    HENT: Negative.    Respiratory: Negative.    Cardiovascular: Negative.    Gastrointestinal: Negative.    Endocrine: Negative.    Genitourinary: Negative.    Musculoskeletal: Negative.    Neurological: Negative.    Psychiatric/Behavioral: Positive for agitation. The patient is nervous/anxious.           Past Medical History:   Diagnosis Date    Asthma     Blood transfusion     Depression     Hypertension     Nervously anxious     Personal history of endometriosis     Strabismus     Uterus, adenomyosis      Past Surgical History:   Procedure Laterality Date    APPENDECTOMY      BLADDER SUSPENSION      BREAST BIOPSY      BREAST CYST EXCISION      CHOLECYSTECTOMY      HYSTERECTOMY      REIMPLANT URETER IN BLADDER      STRABISMUS SURGERY  06/05/13    mr recession [facility]     Family History   Problem Relation Age of Onset    Diabetes Mother     Thyroid disease Mother     Diabetes Maternal Grandmother     Glaucoma Maternal Grandmother     Hypertension Maternal Grandmother     Stroke Maternal Grandmother     Diabetes Maternal Grandfather     Glaucoma Maternal Grandfather     Hypertension Maternal Grandfather      Social History     Socioeconomic History    Marital status:    Tobacco Use    Smoking status: Current Every Day Smoker     Packs/day: 1.00     Types: Vaping w/o nicotine    Smokeless tobacco: Never Used   Substance and Sexual Activity    Alcohol use: No    Drug use: Not Currently     Types: Heroin     Comment: heroin  methodone - 3 months since last used    Sexual activity: Not Currently     Birth control/protection: Other-see comments     Comment: vaginal cuff       Current Outpatient Medications:     acetaminophen (TYLENOL ORAL), Take by mouth as needed., Disp: , Rfl:     albuterol (ACCUNEB) 0.63 mg/3 mL Nebu, Take 3 mLs (0.63 mg total) by nebulization every 6 (six) hours as needed., Disp: 1 each, Rfl: 0    albuterol (PROVENTIL/VENTOLIN HFA) 90 mcg/actuation inhaler, INHALE 1 PUFF BY MOUTH EVERY 4 TO 6 HOURS AS NEEDED FOR SHORTNESS OF BREATH/WHEEZING, Disp: , Rfl:     ALPRAZolam (XANAX) 0.25 MG tablet, Take 1 tablet (0.25 mg total) by mouth daily as needed for Anxiety., Disp: 30 tablet, Rfl: 0    amLODIPine (NORVASC) 10 MG tablet, Take 1 tablet (10 mg total) by mouth once daily., Disp: 90 tablet, Rfl: 0    BELSOMRA 20 mg Tab, Take 1 tablet by mouth every evening., Disp: 30 tablet, Rfl: 2    buprenorphine-naloxone (SUBOXONE) 8-2 mg Film, 2 (two) times a day. , Disp: , Rfl:     buprenorphine-naloxone 8-2 mg (SUBOXONE) 8-2 mg Subl, 2 (two) times a day. , Disp: , Rfl:     diclofenac sodium (VOLTAREN) 1 % Gel, Apply topically as needed., Disp: 100 g, Rfl: 3    famotidine (PEPCID) 40 MG tablet, Take 1 tablet (40 mg total) by mouth once daily., Disp: 30 tablet, Rfl: 2    gabapentin (NEURONTIN) 600 MG tablet, Take 600 mg by mouth as needed. , Disp: , Rfl:     hydrOXYzine pamoate (VISTARIL) 25 MG Cap, Take 1 capsule (25 mg total) by mouth every 8 (eight) hours as needed., Disp: 90 capsule, Rfl: 2    ibuprofen (ADVIL ORAL), Take by mouth as needed., Disp: , Rfl:     melatonin 1 mg/4 mL Drop, every evening. , Disp: , Rfl:     meloxicam (MOBIC) 15 MG tablet, 15 mg., Disp: , Rfl:     montelukast (SINGULAIR) 10 mg tablet, TAKE 1 TABLET BY MOUTH DAILY, Disp: 90 tablet, Rfl: 0    naproxen (NAPROSYN) 500 MG tablet, Take 500 mg by mouth. , Disp: , Rfl:     potassium chloride SA (K-DUR,KLOR-CON) 20 MEQ tablet, TAKE 1 TABLET(20  "MEQ) BY MOUTH THREE TIMES DAILY, Disp: 270 tablet, Rfl: 0    prazosin (MINIPRESS) 5 MG capsule, TAKE 2 CAPSULES BY MOUTH EVERY EVENING, Disp: 60 capsule, Rfl: 2    tiZANidine (ZANAFLEX) 4 MG tablet, Take 1 tablet (4 mg total) by mouth 3 (three) times daily., Disp: 90 tablet, Rfl: 2   Objective:      Vitals:    01/27/22 1120 01/27/22 1122   BP: (!) 171/95 (!) 170/91   BP Location: Left arm Left arm   Patient Position: Sitting Sitting   BP Method: Large (Automatic) Large (Manual)   Pulse: 101    Resp: 20    Temp: 99.6 °F (37.6 °C)    TempSrc: Oral    SpO2: 98%    Weight: 78 kg (171 lb 15.3 oz)    Height: 5' 3" (1.6 m)        Physical Exam  Constitutional:       General: She is not in acute distress.     Appearance: She is not diaphoretic.   HENT:      Head: Normocephalic and atraumatic.   Eyes:      Conjunctiva/sclera: Conjunctivae normal.   Pulmonary:      Effort: Pulmonary effort is normal.   Musculoskeletal:         General: Normal range of motion.      Cervical back: Neck supple.   Skin:     Findings: No rash.   Neurological:      Mental Status: She is alert and oriented to person, place, and time.   Psychiatric:         Mood and Affect: Mood and affect normal.         Speech: Speech is rapid and pressured.         Behavior: Behavior is agitated and hyperactive.            Assessment:       1. Insomnia, unspecified type        Plan:       Insomnia, unspecified type  -     BELSOMRA 20 mg Tab; Take 1 tablet by mouth every evening.  Dispense: 30 tablet; Refill: 2      Pt was advised to f/u w/ psych. May need to go to the ED since she continues to have severe anxiety. Pt was offered anxiety meds but states she took them before and they did not help. F/u in 3 months.            Patient note was created using [employer].  Any errors in syntax or even information may not have been identified and edited on initial review prior to signing this note.    "

## 2022-01-27 NOTE — TELEPHONE ENCOUNTER
----- Message from Fly Taylor MA sent at 1/27/2022  2:20 PM CST -----  Type: Patient Call Back    Who called:self    What is the request in detail:she would like her medication sent to another pharmacy due to her normal pharmacy not having the medicine..    Can the clinic reply by [facility]?no    Would the patient rather a call back or a response via [facility]? yes    Best call back number:[phone]
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
## 2022-01-27 NOTE — TELEPHONE ENCOUNTER
----- Message from Era Padilla sent at 1/27/2022 10:44 AM CST -----  Contact: Patient [phone]  Type: Patient Call Back    Who called: Patient     What is the request in detail: Patient called at 10:42 am stating that she's coming in for her 11 am appointment, and that she's running late. States she's suffering from crippling Anxiety. Can't find jacket, keys, and had to put her dog up, but she's walking out of the door. Really need to be seen. Will be on her way.

## 2022-01-28 RX ORDER — SUVOREXANT 20 MG/1
1 TABLET, FILM COATED ORAL NIGHTLY
Qty: 30 TABLET | Refills: 2 | Status: SHIPPED | OUTPATIENT
Start: 2022-01-28

## 2022-02-03 ENCOUNTER — PATIENT MESSAGE (OUTPATIENT)
Dept: FAMILY MEDICINE | Facility: CLINIC | Age: 52
End: 2022-02-03
Payer: MEDICARE

## 2022-02-03 RX ORDER — TIZANIDINE 4 MG/1
4 TABLET ORAL 3 TIMES DAILY
Qty: 90 TABLET | Refills: 2 | Status: SHIPPED | OUTPATIENT
Start: 2022-02-03

## 2022-02-03 RX ORDER — PRAZOSIN HYDROCHLORIDE 5 MG/1
10 CAPSULE ORAL NIGHTLY
Qty: 60 CAPSULE | Refills: 2 | Status: SHIPPED | OUTPATIENT
Start: 2022-02-03

## 2022-02-03 RX ORDER — AMLODIPINE BESYLATE 10 MG/1
10 TABLET ORAL DAILY
Qty: 90 TABLET | Refills: 0 | Status: SHIPPED | OUTPATIENT
Start: 2022-02-03

## 2022-02-03 RX ORDER — POTASSIUM CHLORIDE 20 MEQ/1
TABLET, EXTENDED RELEASE ORAL
Qty: 270 TABLET | Refills: 0 | Status: SHIPPED | OUTPATIENT
Start: 2022-02-03

## 2022-05-23 DIAGNOSIS — F43.10 PTSD (POST-TRAUMATIC STRESS DISORDER): ICD-10-CM

## 2022-05-23 DIAGNOSIS — F51.4 NIGHT TERRORS: ICD-10-CM

## 2022-05-23 NOTE — TELEPHONE ENCOUNTER
Last Office Visit Info:   The patient's last visit with Primary Doctor No was on Visit date not found.    The patient's last visit in current department was on 1/27/2022.        Last CBC Results:   Lab Results   Component Value Date    WBC 11.61 06/09/2021    HGB 11.8 (L) 06/09/2021    HCT 37.1 06/09/2021     06/09/2021       Last CMP Results  Lab Results   Component Value Date     06/09/2021    K 3.6 06/09/2021     06/09/2021    CO2 22 (L) 06/09/2021    BUN 11 06/09/2021    CREATININE 0.8 06/09/2021    CALCIUM 9.0 06/09/2021    ALBUMIN 3.9 06/09/2021    AST 18 06/09/2021    ALT 9 (L) 06/09/2021       Last Lipids  Lab Results   Component Value Date    CHOL 172 06/09/2021    TRIG 185 (H) 06/09/2021    HDL 51 06/09/2021    LDLCALC 84.0 06/09/2021       Last A1C  Lab Results   Component Value Date    HGBA1C 5.3 06/09/2021       Last TSH  Lab Results   Component Value Date    TSH 1.436 06/09/2021             Current Med Refills  Medication List with Changes/Refills   Current Medications    ACETAMINOPHEN (TYLENOL ORAL)    Take by mouth as needed.       Start Date: --        End Date: --    ALBUTEROL (ACCUNEB) 0.63 MG/3 ML NEBU    Take 3 mLs (0.63 mg total) by nebulization every 6 (six) hours as needed.       Start Date: 12/3/2021 End Date: --    ALBUTEROL (PROVENTIL/VENTOLIN HFA) 90 MCG/ACTUATION INHALER    INHALE 1 PUFF BY MOUTH EVERY 4 TO 6 HOURS AS NEEDED FOR SHORTNESS OF BREATH/WHEEZING       Start Date: 1/10/2021 End Date: --    ALPRAZOLAM (XANAX) 0.25 MG TABLET    Take 1 tablet (0.25 mg total) by mouth daily as needed for Anxiety.       Start Date: 9/29/2021 End Date: 10/29/2021    AMLODIPINE (NORVASC) 10 MG TABLET    Take 1 tablet (10 mg total) by mouth once daily.       Start Date: 2/3/2022  End Date: --    BELSOMRA 20 MG TAB    Take 1 tablet by mouth every evening.       Start Date: 1/28/2022 End Date: --    BUPRENORPHINE-NALOXONE (SUBOXONE) 8-2 MG FILM    2 (two) times a day.        Start  Date: 8/6/2020  End Date: --    BUPRENORPHINE-NALOXONE 8-2 MG (SUBOXONE) 8-2 MG SUBL    2 (two) times a day.        Start Date: --        End Date: --    DICLOFENAC SODIUM (VOLTAREN) 1 % GEL    Apply topically as needed.       Start Date: 4/7/2021  End Date: --    FAMOTIDINE (PEPCID) 40 MG TABLET    Take 1 tablet (40 mg total) by mouth once daily.       Start Date: 8/10/2020 End Date: --    GABAPENTIN (NEURONTIN) 600 MG TABLET    Take 600 mg by mouth as needed.        Start Date: 8/17/2020 End Date: --    HYDROXYZINE PAMOATE (VISTARIL) 25 MG CAP    Take 1 capsule (25 mg total) by mouth every 8 (eight) hours as needed.       Start Date: 12/3/2021 End Date: --    IBUPROFEN (ADVIL ORAL)    Take by mouth as needed.       Start Date: --        End Date: --    MELATONIN 1 MG/4 ML DROP    every evening.        Start Date: --        End Date: --    MELOXICAM (MOBIC) 15 MG TABLET    15 mg.       Start Date: 6/11/2021 End Date: --    MONTELUKAST (SINGULAIR) 10 MG TABLET    TAKE 1 TABLET BY MOUTH DAILY       Start Date: 3/24/2021 End Date: --    NAPROXEN (NAPROSYN) 500 MG TABLET    Take 500 mg by mouth.        Start Date: 11/2/2020 End Date: --    POTASSIUM CHLORIDE SA (K-DUR,KLOR-CON) 20 MEQ TABLET    TAKE 1 TABLET(20 MEQ) BY MOUTH THREE TIMES DAILY       Start Date: 2/3/2022  End Date: --    PRAZOSIN (MINIPRESS) 5 MG CAPSULE    Take 2 capsules (10 mg total) by mouth every evening.       Start Date: 2/3/2022  End Date: --    TIZANIDINE (ZANAFLEX) 4 MG TABLET    Take 1 tablet (4 mg total) by mouth 3 (three) times daily.       Start Date: 2/3/2022  End Date: --       Order(s) placed per written order guidelines:    Please advise.

## 2022-05-24 RX ORDER — PRAZOSIN HYDROCHLORIDE 5 MG/1
10 CAPSULE ORAL NIGHTLY
Qty: 60 CAPSULE | Refills: 2 | OUTPATIENT
Start: 2022-05-24

## 2022-08-09 ENCOUNTER — PES CALL (OUTPATIENT)
Dept: ADMINISTRATIVE | Facility: CLINIC | Age: 52
End: 2022-08-09
Payer: MEDICARE

## 2022-09-07 ENCOUNTER — PES CALL (OUTPATIENT)
Dept: ADMINISTRATIVE | Facility: CLINIC | Age: 52
End: 2022-09-07
Payer: MEDICARE

## 2022-09-12 ENCOUNTER — PES CALL (OUTPATIENT)
Dept: ADMINISTRATIVE | Facility: CLINIC | Age: 52
End: 2022-09-12
Payer: MEDICARE

## 2022-09-20 ENCOUNTER — PES CALL (OUTPATIENT)
Dept: ADMINISTRATIVE | Facility: CLINIC | Age: 52
End: 2022-09-20
Payer: MEDICARE

## 2022-09-23 ENCOUNTER — PES CALL (OUTPATIENT)
Dept: ADMINISTRATIVE | Facility: CLINIC | Age: 52
End: 2022-09-23
Payer: MEDICARE

## 2024-04-07 ENCOUNTER — HOSPITAL ENCOUNTER (OUTPATIENT)
Facility: OTHER | Age: 54
Discharge: HOME | End: 2024-04-08
Attending: EMERGENCY MEDICINE | Admitting: EMERGENCY MEDICINE
Payer: MEDICARE

## 2024-04-07 DIAGNOSIS — N17.9 ACUTE KIDNEY INJURY: Primary | ICD-10-CM

## 2024-04-07 DIAGNOSIS — E87.6 HYPOKALEMIA: ICD-10-CM

## 2024-04-07 DIAGNOSIS — R53.83 FATIGUE: ICD-10-CM

## 2024-04-07 LAB
BASOPHILS # BLD AUTO: 0.07 K/UL (ref 0–0.2)
BASOPHILS NFR BLD: 0.6 % (ref 0–1.9)
DIFFERENTIAL METHOD BLD: ABNORMAL
EOSINOPHIL # BLD AUTO: 0.1 K/UL (ref 0–0.5)
EOSINOPHIL NFR BLD: 0.6 % (ref 0–8)
ERYTHROCYTE [DISTWIDTH] IN BLOOD BY AUTOMATED COUNT: 15.9 % (ref 11.5–14.5)
HCT VFR BLD AUTO: 32.9 % (ref 37–48.5)
HGB BLD-MCNC: 10.4 G/DL (ref 12–16)
IMM GRANULOCYTES # BLD AUTO: 0.03 K/UL (ref 0–0.04)
IMM GRANULOCYTES NFR BLD AUTO: 0.3 % (ref 0–0.5)
LYMPHOCYTES # BLD AUTO: 1.3 K/UL (ref 1–4.8)
LYMPHOCYTES NFR BLD: 10.8 % (ref 18–48)
MCH RBC QN AUTO: 28 PG (ref 27–31)
MCHC RBC AUTO-ENTMCNC: 31.6 G/DL (ref 32–36)
MCV RBC AUTO: 89 FL (ref 82–98)
MONOCYTES # BLD AUTO: 0.5 K/UL (ref 0.3–1)
MONOCYTES NFR BLD: 4.6 % (ref 4–15)
NEUTROPHILS # BLD AUTO: 9.7 K/UL (ref 1.8–7.7)
NEUTROPHILS NFR BLD: 83.1 % (ref 38–73)
NRBC BLD-RTO: 0 /100 WBC
PLATELET # BLD AUTO: 400 K/UL (ref 150–450)
PMV BLD AUTO: 10.4 FL (ref 9.2–12.9)
RBC # BLD AUTO: 3.71 M/UL (ref 4–5.4)
WBC # BLD AUTO: 11.67 K/UL (ref 3.9–12.7)

## 2024-04-07 PROCEDURE — 83735 ASSAY OF MAGNESIUM: CPT | Performed by: EMERGENCY MEDICINE

## 2024-04-07 PROCEDURE — 25000003 PHARM REV CODE 250: Performed by: EMERGENCY MEDICINE

## 2024-04-07 PROCEDURE — 93005 ELECTROCARDIOGRAM TRACING: CPT

## 2024-04-07 PROCEDURE — 81003 URINALYSIS AUTO W/O SCOPE: CPT | Performed by: EMERGENCY MEDICINE

## 2024-04-07 PROCEDURE — 85025 COMPLETE CBC W/AUTO DIFF WBC: CPT | Performed by: EMERGENCY MEDICINE

## 2024-04-07 PROCEDURE — 80053 COMPREHEN METABOLIC PANEL: CPT | Performed by: EMERGENCY MEDICINE

## 2024-04-07 PROCEDURE — 93010 ELECTROCARDIOGRAM REPORT: CPT | Mod: ,,, | Performed by: INTERNAL MEDICINE

## 2024-04-07 RX ORDER — SPIRONOLACTONE 25 MG/1
50 TABLET ORAL
Status: COMPLETED | OUTPATIENT
Start: 2024-04-07 | End: 2024-04-07

## 2024-04-07 RX ORDER — NIFEDIPINE 30 MG/1
60 TABLET, EXTENDED RELEASE ORAL
Status: COMPLETED | OUTPATIENT
Start: 2024-04-07 | End: 2024-04-07

## 2024-04-07 RX ADMIN — SPIRONOLACTONE 50 MG: 25 TABLET ORAL at 11:04

## 2024-04-07 RX ADMIN — NIFEDIPINE 60 MG: 30 TABLET, FILM COATED, EXTENDED RELEASE ORAL at 11:04

## 2024-04-07 NOTE — Clinical Note
Diagnosis: Acute kidney injury [[id number]]   Future Attending Provider: KATHRINE RALPH [[id number]]   Is the patient being sent to ED Observation?: Yes

## 2024-04-08 VITALS
OXYGEN SATURATION: 98 % | RESPIRATION RATE: 16 BRPM | HEART RATE: 84 BPM | SYSTOLIC BLOOD PRESSURE: 170 MMHG | TEMPERATURE: 98 F | DIASTOLIC BLOOD PRESSURE: 91 MMHG

## 2024-04-08 LAB
ALBUMIN SERPL BCP-MCNC: 3.9 G/DL (ref 3.5–5.2)
ALP SERPL-CCNC: 129 U/L (ref 55–135)
ALT SERPL W/O P-5'-P-CCNC: 14 U/L (ref 10–44)
ANION GAP SERPL CALC-SCNC: 10 MMOL/L (ref 8–16)
ANION GAP SERPL CALC-SCNC: 12 MMOL/L (ref 8–16)
ANION GAP SERPL CALC-SCNC: 9 MMOL/L (ref 8–16)
AST SERPL-CCNC: 18 U/L (ref 10–40)
BILIRUB SERPL-MCNC: 0.4 MG/DL (ref 0.1–1)
BILIRUB UR QL STRIP: NEGATIVE
BUN SERPL-MCNC: 16 MG/DL (ref 6–20)
BUN SERPL-MCNC: 18 MG/DL (ref 6–20)
BUN SERPL-MCNC: 18 MG/DL (ref 6–20)
CALCIUM SERPL-MCNC: 8.5 MG/DL (ref 8.7–10.5)
CALCIUM SERPL-MCNC: 8.5 MG/DL (ref 8.7–10.5)
CALCIUM SERPL-MCNC: 8.9 MG/DL (ref 8.7–10.5)
CHLORIDE SERPL-SCNC: 107 MMOL/L (ref 95–110)
CHLORIDE SERPL-SCNC: 110 MMOL/L (ref 95–110)
CHLORIDE SERPL-SCNC: 110 MMOL/L (ref 95–110)
CLARITY UR: CLEAR
CO2 SERPL-SCNC: 23 MMOL/L (ref 23–29)
CO2 SERPL-SCNC: 24 MMOL/L (ref 23–29)
CO2 SERPL-SCNC: 25 MMOL/L (ref 23–29)
COLOR UR: COLORLESS
CREAT SERPL-MCNC: 1.2 MG/DL (ref 0.5–1.4)
CREAT SERPL-MCNC: 1.3 MG/DL (ref 0.5–1.4)
CREAT SERPL-MCNC: 1.5 MG/DL (ref 0.5–1.4)
EST. GFR  (NO RACE VARIABLE): 41 ML/MIN/1.73 M^2
EST. GFR  (NO RACE VARIABLE): 49 ML/MIN/1.73 M^2
EST. GFR  (NO RACE VARIABLE): 54 ML/MIN/1.73 M^2
GLUCOSE SERPL-MCNC: 103 MG/DL (ref 70–110)
GLUCOSE SERPL-MCNC: 113 MG/DL (ref 70–110)
GLUCOSE SERPL-MCNC: 98 MG/DL (ref 70–110)
GLUCOSE UR QL STRIP: NEGATIVE
HGB UR QL STRIP: NEGATIVE
KETONES UR QL STRIP: NEGATIVE
LEUKOCYTE ESTERASE UR QL STRIP: NEGATIVE
MAGNESIUM SERPL-MCNC: 2 MG/DL (ref 1.6–2.6)
NITRITE UR QL STRIP: NEGATIVE
OHS QRS DURATION: 96 MS
OHS QTC CALCULATION: 511 MS
PH UR STRIP: 8 [PH] (ref 5–8)
POTASSIUM SERPL-SCNC: 2.7 MMOL/L (ref 3.5–5.1)
POTASSIUM SERPL-SCNC: 2.9 MMOL/L (ref 3.5–5.1)
POTASSIUM SERPL-SCNC: 3.1 MMOL/L (ref 3.5–5.1)
PROT SERPL-MCNC: 7.7 G/DL (ref 6–8.4)
PROT UR QL STRIP: ABNORMAL
SODIUM SERPL-SCNC: 143 MMOL/L (ref 136–145)
SODIUM SERPL-SCNC: 143 MMOL/L (ref 136–145)
SODIUM SERPL-SCNC: 144 MMOL/L (ref 136–145)
SP GR UR STRIP: 1.01 (ref 1–1.03)
URN SPEC COLLECT METH UR: ABNORMAL
UROBILINOGEN UR STRIP-ACNC: NEGATIVE EU/DL

## 2024-04-08 PROCEDURE — 80048 BASIC METABOLIC PNL TOTAL CA: CPT | Mod: 91 | Performed by: PHYSICIAN ASSISTANT

## 2024-04-08 PROCEDURE — 25000003 PHARM REV CODE 250

## 2024-04-08 PROCEDURE — 96361 HYDRATE IV INFUSION ADD-ON: CPT

## 2024-04-08 PROCEDURE — 36415 COLL VENOUS BLD VENIPUNCTURE: CPT | Performed by: EMERGENCY MEDICINE

## 2024-04-08 PROCEDURE — 96360 HYDRATION IV INFUSION INIT: CPT

## 2024-04-08 PROCEDURE — 99284 EMERGENCY DEPT VISIT MOD MDM: CPT | Mod: 25

## 2024-04-08 PROCEDURE — 25000003 PHARM REV CODE 250: Performed by: EMERGENCY MEDICINE

## 2024-04-08 PROCEDURE — 80048 BASIC METABOLIC PNL TOTAL CA: CPT | Performed by: EMERGENCY MEDICINE

## 2024-04-08 PROCEDURE — 36415 COLL VENOUS BLD VENIPUNCTURE: CPT | Performed by: PHYSICIAN ASSISTANT

## 2024-04-08 PROCEDURE — G0378 HOSPITAL OBSERVATION PER HR: HCPCS

## 2024-04-08 RX ORDER — SPIRONOLACTONE 50 MG/1
50 TABLET, FILM COATED ORAL DAILY
Qty: 30 TABLET | Refills: 0 | Status: SHIPPED | OUTPATIENT
Start: 2024-04-08 | End: 2024-05-08

## 2024-04-08 RX ORDER — ONDANSETRON HYDROCHLORIDE 2 MG/ML
4 INJECTION, SOLUTION INTRAVENOUS EVERY 8 HOURS PRN
Status: DISCONTINUED | OUTPATIENT
Start: 2024-04-08 | End: 2024-04-08 | Stop reason: HOSPADM

## 2024-04-08 RX ORDER — NIFEDIPINE 60 MG/1
60 TABLET, EXTENDED RELEASE ORAL DAILY
Qty: 30 TABLET | Refills: 0 | Status: SHIPPED | OUTPATIENT
Start: 2024-04-08 | End: 2024-05-08

## 2024-04-08 RX ORDER — HYDROCODONE BITARTRATE AND ACETAMINOPHEN 5; 325 MG/1; MG/1
1 TABLET ORAL EVERY 4 HOURS PRN
Status: DISCONTINUED | OUTPATIENT
Start: 2024-04-08 | End: 2024-04-08 | Stop reason: HOSPADM

## 2024-04-08 RX ORDER — POTASSIUM CHLORIDE 750 MG/1
10 TABLET, EXTENDED RELEASE ORAL DAILY
Qty: 30 TABLET | Refills: 0 | Status: SHIPPED | OUTPATIENT
Start: 2024-04-08 | End: 2024-05-08

## 2024-04-08 RX ORDER — SODIUM CHLORIDE 0.9 % (FLUSH) 0.9 %
10 SYRINGE (ML) INJECTION
Status: DISCONTINUED | OUTPATIENT
Start: 2024-04-08 | End: 2024-04-08 | Stop reason: HOSPADM

## 2024-04-08 RX ORDER — POTASSIUM CHLORIDE 20 MEQ/1
40 TABLET, EXTENDED RELEASE ORAL 3 TIMES DAILY
Status: DISCONTINUED | OUTPATIENT
Start: 2024-04-08 | End: 2024-04-08 | Stop reason: HOSPADM

## 2024-04-08 RX ORDER — TALC
6 POWDER (GRAM) TOPICAL NIGHTLY PRN
Status: DISCONTINUED | OUTPATIENT
Start: 2024-04-08 | End: 2024-04-08 | Stop reason: HOSPADM

## 2024-04-08 RX ORDER — HYDROXYZINE PAMOATE 25 MG/1
25 CAPSULE ORAL
Status: COMPLETED | OUTPATIENT
Start: 2024-04-08 | End: 2024-04-08

## 2024-04-08 RX ORDER — SPIRONOLACTONE 25 MG/1
50 TABLET ORAL DAILY
Status: DISCONTINUED | OUTPATIENT
Start: 2024-04-08 | End: 2024-04-08 | Stop reason: HOSPADM

## 2024-04-08 RX ORDER — ACETAMINOPHEN 325 MG/1
650 TABLET ORAL EVERY 8 HOURS PRN
Status: DISCONTINUED | OUTPATIENT
Start: 2024-04-08 | End: 2024-04-08 | Stop reason: HOSPADM

## 2024-04-08 RX ORDER — NIFEDIPINE 30 MG/1
60 TABLET, EXTENDED RELEASE ORAL DAILY
Status: DISCONTINUED | OUTPATIENT
Start: 2024-04-08 | End: 2024-04-08 | Stop reason: HOSPADM

## 2024-04-08 RX ORDER — SODIUM CHLORIDE 9 MG/ML
1000 INJECTION, SOLUTION INTRAVENOUS
Status: COMPLETED | OUTPATIENT
Start: 2024-04-08 | End: 2024-04-08

## 2024-04-08 RX ORDER — SODIUM CHLORIDE 9 MG/ML
INJECTION, SOLUTION INTRAVENOUS CONTINUOUS
Status: DISCONTINUED | OUTPATIENT
Start: 2024-04-08 | End: 2024-04-08 | Stop reason: HOSPADM

## 2024-04-08 RX ORDER — HYDROXYZINE HYDROCHLORIDE 25 MG/1
25 TABLET, FILM COATED ORAL 3 TIMES DAILY PRN
Qty: 28 TABLET | Refills: 0 | Status: SHIPPED | OUTPATIENT
Start: 2024-04-08 | End: 2024-04-22

## 2024-04-08 RX ADMIN — SODIUM CHLORIDE: 9 INJECTION, SOLUTION INTRAVENOUS at 03:04

## 2024-04-08 RX ADMIN — SPIRONOLACTONE 50 MG: 25 TABLET, FILM COATED ORAL at 08:04

## 2024-04-08 RX ADMIN — POTASSIUM CHLORIDE 40 MEQ: 1500 TABLET, EXTENDED RELEASE ORAL at 08:04

## 2024-04-08 RX ADMIN — HYDROXYZINE PAMOATE 25 MG: 25 CAPSULE ORAL at 12:04

## 2024-04-08 RX ADMIN — SODIUM CHLORIDE 1000 ML: 9 INJECTION, SOLUTION INTRAVENOUS at 12:04

## 2024-04-08 RX ADMIN — POTASSIUM CHLORIDE 40 MEQ: 1500 TABLET, EXTENDED RELEASE ORAL at 03:04

## 2024-04-08 RX ADMIN — NIFEDIPINE 60 MG: 30 TABLET, FILM COATED, EXTENDED RELEASE ORAL at 08:04

## 2024-04-08 RX ADMIN — SODIUM CHLORIDE: 9 INJECTION, SOLUTION INTRAVENOUS at 10:04

## 2024-04-08 NOTE — ED PROVIDER NOTES
Source of History:  The patient    Chief complaint:  Emesis (Patient reports vomiting blood and generalized weakness 2 hours ago. Hx of renal cell carcinoma )      HPI:  Nidhi Avendaño is a 53 y.o. female  brought in by paramedics for generalized weakness for the last couple of days.  Patient states she has a history of hyperkalemia as well as low potassium.  States she is also concerned of her elevated blood pressure.  Has a history of hypertension but due to reported loss of primary care patient has not taken blood pressure medication for probably 2 months.  She states she is concerned she has a history of renal cell carcinoma that was diagnosed at [facility] last October or November.  She has not followed up with any oncology or any other specialists.  She denies any chest pain just generalized fatigue in his concerned about her potassium.    She states she did have a couple episodes of vomiting today and in the phlegm noted some minimal bright red blood.  Denies any dark stool.    This is the extent to the patients complaints today here in the emergency department.    ROS:   See HPI.    Review of patient's allergies indicates:   Allergen Reactions    Latex, natural rubber Anaphylaxis    Beta-blockers (beta-adrenergic blocking agts)     Compazine [prochlorperazine]     Corticosteroids (glucocorticoids) Hallucinations     All Steroids    Latex     Phenergan [promethazine]     Thorazine [chlorpromazine]        PMH:  As per HPI and below:  Past Medical History:   Diagnosis Date    Asthma     Blood transfusion     Depression     Hypertension     Nervously anxious     Personal history of endometriosis     Strabismus     Uterus, adenomyosis      Past Surgical History:   Procedure Laterality Date    APPENDECTOMY      BLADDER SUSPENSION      BREAST BIOPSY      BREAST CYST EXCISION      CHOLECYSTECTOMY      HYSTERECTOMY      REIMPLANT URETER IN BLADDER      STRABISMUS SURGERY  06/05/13    mr cesar MARTINEZ        Social History     Tobacco Use    Smoking status: Every Day     Current packs/day: 1.00     Types: Vaping w/o nicotine, Cigarettes    Smokeless tobacco: Never   Substance Use Topics    Alcohol use: No    Drug use: Not Currently     Types: Heroin     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       Physical Exam:    BP (!) 170/91 (BP Location: Right arm, Patient Position: Lying) Comment: provider aware.  Pulse 84   Temp 98.3 °F (36.8 °C) (Oral)   Resp 16   SpO2 98%   Nursing note and vital signs reviewed.  Constitutional: No acute distress.  Nontoxic  Cardiovascular: Regular rate and rhythm.  No murmurs. No gallops. No rubs  Respiratory: Clear to auscultation bilaterally.  Good air movement.  No wheezes.  No rhonchi. No rales. No accessory muscle use..  Abdomen: Soft.  Not distended.  Nontender.  No guarding.  No rebound. Non-peritoneal.  Musculoskeletal: Good range of motion all joints.  No deformities.  Neck supple.  No meningismus.  Extremities: No pitting edema  Neuro: alert. At baseline.  No focal neurological deficits.  Psych:  Tearful, depressed mood, normal affect.  No suicidal homicidal ideations.    Summary of Previous Medical Records:  11/07/2023-11/16/2023 reviewed discharge summary from [facility].  Diagnosed hospitalized for pyelonephritis.  A chart noted the patient had a CT abdomen pelvis October 2023 showing left renal mass concerning for renal cell carcinoma.  MRI of the abdomen during hospitalization on 11/09/2023 showed a left lateral 4.3 cm renal mass concerning of infiltrative renal cell carcinoma.  Urology was consulted recommended a CT urogram.  This showed reduction in the left kidney with reduction in perinephric fat stranding and decreased area of enhancement.  Also identified and unchanged 0.6 cm simple cyst to left upper pole and 1.1 cm cystic focus to the inferior spleen.  Upon discussion with Urology they did not believe that this mass was renal cell carcinoma  and most likely related to infectious etiology.  She also had acute kidney injury with a creatinine of 2.03 on admission and a baseline of proximally 1-1.2.  This was suggestive of prerenal etiology and improved to baseline with fluids.    Also has a history of hypertension and was on nifedipine 60 mg daily as well as Aldactone 50 mg daily.    Differential Dx considered but not limited to:    Metabolic derangement, hyperkalemia or hypokalemia.  Abdominal exam is benign.  No evidence of acute appendicitis diverticulitis or cholecystitis.  No history to suggest urinary tract infection or pyelonephritis.    MDM/ Workup:  Will get labs, urine give blood pressure medication that she was previously taking and observe.  No indication for imaging at this time based on my examination as well as the history.      ED Course as of 04/08/24 2248   [place] Apr 07, 2024 2327 EKG 12-lead  EKG independently interpreted by myself shows normal sinus rhythm at a rate of 81, normal intervals, narrow QRS, no acute ST T wave abnormalities.  QTC is 511 milliseconds.  Compared to an EKG on May 27, 2014 shows no change. [SM]   2336 WBC: 11.67 [SM]   2336 Hemoglobin(!): 10.4 [SM]   2336 Platelet Count: 400 [SM]   Mon Apr 08, 2024   0024 Sodium: 143 [SM]   0024 Creatinine(!): 1.5  Baseline 0.7-0.8.  Consistent with acute kidney injury. [SM]   0024 Potassium(!!): 2.7 [SM]   0024 NITRITE UA: Negative [SM]   0024 Leukocyte Esterase, UA: Negative [SM]   0120 Updated the patient on the results.  Will admit to the ED observation unit for acute kidney injury and hypokalemia.  Give IV fluids recheck labs in the morning and when improve plan for discharge.  I do feel the patient would also benefit from a consultation with  for outpatient follow up. [SM]      ED Course User Index  [SM] Mohit Clark, DO                 Diagnostic Impression:    1. Acute kidney injury    2. Fatigue    3. Hypokalemia         ED Disposition Condition     Discharge Stable            ED Prescriptions       Medication Sig Dispense Start Date End Date Auth. Provider    potassium chloride (KLOR-CON) 10 MEQ TbSR Take 1 tablet (10 mEq total) by mouth once daily. 30 tablet 4/8/2024 5/8/2024 Thai Cormier PA-C    hydrOXYzine HCL (ATARAX) 25 MG tablet Take 1 tablet (25 mg total) by mouth 3 (three) times daily as needed for Anxiety. 28 tablet 4/8/2024 4/22/2024 Thai Cormier PA-C    NIFEdipine (PROCARDIA-XL) 60 MG (OSM) 24 hr tablet Take 1 tablet (60 mg total) by mouth once daily. 30 tablet 4/8/2024 5/8/2024 Thai Cormier PA-C    spironolactone (ALDACTONE) 50 MG tablet Take 1 tablet (50 mg total) by mouth once daily. 30 tablet 4/8/2024 5/8/2024 Thai Cormier PA-C          Follow-up Information    None          Mohit Clark,   04/08/24 0210       Mohit Clark,   04/08/24 2626

## 2024-04-08 NOTE — DISCHARGE INSTRUCTIONS
Take blood pressure medications as prescribed. Limit your daily salt intake. Avoid caffeine and alcohol as much as possible.     Take potassium chloride once daily.   Take Atarax 25 mg 3 times daily as needed for anxiety.     Follow up with urology regarding possible renal mass. Your appointment is 4/18/24 at [facility] with Dr. Jeovanny Haley.   [address] [facility] [place], [address]  [facility] [address]  Phone: [phone]    Follow up with PCP for further management of blood pressure and other chronic problems.     Hospital Follow Up with Negra Goodson MD  Friday May 3 11:00 AM [facility] [place] [facility]  [address]  [address] [address] [phone]

## 2024-04-08 NOTE — ED NOTES
Pt reports elevated bp, nausea, episode of vomiting mucus blood. Pt also reports generalized weakness. Pt denies pain. Pt noncompliant with home medications. Ccm/bp/o2 monitoring in place. Ed work-up pending. Call light within reach

## 2024-04-08 NOTE — DISCHARGE SUMMARY
[facility] ED Observation Unit  Discharge Summary        History of Present Illness:    Nidhi Avendaño is a 53 y.o. female  brought in by paramedics for generalized weakness for the last couple of days.  Patient states she has a history of hyperkalemia as well as low potassium.  States she is also concerned of her elevated blood pressure.  Has a history of hypertension but due to reported loss of primary care patient has not taken blood pressure medication for probably 2 months.  She states she is concerned she has a history of renal cell carcinoma that was diagnosed at [facility] last October or November.  She has not followed up with any oncology or any other specialists.  She denies any chest pain just generalized fatigue in his concerned about her potassium.     She states she did have a couple episodes of vomiting today and in the phlegm noted some minimal bright red blood.  Denies any dark stool.     I reviewed the ED Provider Note dated 4/7/24 prior to my evaluation of this patient.  I reviewed all labs and imaging performed in the Main ED, prior to patient being placed in Observation. Patient was placed in the ED Observation Unit for acute kidney injury and hypokalemia.    Observation Course:      4/8/24  Per review of today's labs, potassium has improved to 3.1. SHANA has resolved with BUN 16 and Cr of 1.2. eGFR of 54. Glucose mildly elevated at 113. She has been receiving PO potassium and IV fluids. On my assessment, patient is feeling better. Tolerating PO.     Per review of yesterday's labs, no leukocytosis.  Hemoglobin 10.4 and hematocrit 32.9.  Patient does have history of anemia on previous labs.  CMP revealed hypokalemia of 2.7 and acute kidney injury with creatinine of 1.5.  Baseline creatinine is usually 0.7-1.0.  Magnesium within normal limits.  UA only notable for trace protein.  Was admitted to EDOU for continued IV fluids, potassium repletion, and repeat of chemistry in the morning.       Brief Physical Exam/Reassessment   ROS  See HPI.    Physical Exam    Nursing note and vitals reviewed.  Constitutional: She appears well-developed and well-nourished. No distress.   HENT:   Head: Normocephalic and atraumatic.   Nose: Nose normal.   Mouth/Throat: Oropharynx is clear and moist.   Eyes: Conjunctivae and EOM are normal.   Neck: Neck supple.   Normal range of motion.  Cardiovascular:  Normal rate, regular rhythm, normal heart sounds and intact distal pulses.           Pulmonary/Chest: Breath sounds normal. No respiratory distress. She has no wheezes. She has no rhonchi. She has no rales.   Abdominal: Abdomen is soft. Bowel sounds are normal. She exhibits no distension and no mass. There is no abdominal tenderness. There is no rebound and no guarding.   Musculoskeletal:         General: Normal range of motion.      Cervical back: Normal range of motion and neck supple.     Neurological: She is alert and oriented to person, place, and time. She has normal strength.   Skin: Skin is warm and dry.   Psychiatric: She has a normal mood and affect. Her behavior is normal. Judgment and thought content normal.         Consultants:    N/A    ED/OBS Workup:  Vitals:    04/08/24 0359 04/08/24 0822 04/08/24 1228 04/08/24 1556   BP: (!) 146/84 (!) 159/87 (!) 161/74 (!) 170/91   Pulse: 73 78 79 84   Resp: 18 16 16 16   Temp: 98.3 °F (36.8 °C) 97.6 °F (36.4 °C) 97.9 °F (36.6 °C) 98.3 °F (36.8 °C)   TempSrc: Oral Oral Oral Oral   SpO2: 98% 97% 98% 98%    04/08/24 1600   BP: (!) 170/91   Pulse: 84   Resp: 16   Temp: 98.3 °F (36.8 °C)   TempSrc: Oral   SpO2: 98%       Labs Reviewed   CBC W/ AUTO DIFFERENTIAL - Abnormal; Notable for the following components:       Result Value    RBC 3.71 (*)     Hemoglobin 10.4 (*)     Hematocrit 32.9 (*)     MCHC 31.6 (*)     RDW 15.9 (*)     Gran # (ANC) 9.7 (*)     Gran % 83.1 (*)     Lymph % 10.8 (*)     All other components within normal limits   COMPREHENSIVE METABOLIC PANEL -  Abnormal; Notable for the following components:    Potassium 2.7 (*)     Creatinine 1.5 (*)     eGFR 41 (*)     All other components within normal limits    Narrative:     Potassium critical result(s) called and verbal readback obtained from   Jennifer Santiago RN by DETL 04/08/2024 00:03   URINALYSIS, REFLEX TO URINE CULTURE - Abnormal; Notable for the following components:    Color, UA Colorless (*)     Protein, UA Trace (*)     All other components within normal limits    Narrative:     Specimen Source->Urine   BASIC METABOLIC PANEL - Abnormal; Notable for the following components:    Potassium 2.9 (*)     Calcium 8.5 (*)     eGFR 49 (*)     All other components within normal limits   BASIC METABOLIC PANEL - Abnormal; Notable for the following components:    Potassium 3.1 (*)     Glucose 113 (*)     Calcium 8.5 (*)     eGFR 54 (*)     All other components within normal limits   MAGNESIUM       No orders to display       Final Diagnosis:  1. Acute kidney injury    2. Fatigue    3. Hypokalemia        Plan:  Resolved. Advised to stay hydrated. Advised her to follow up with urology and nephrology.   Feeling better.   Improved from 2.7 to 3.1 Patient tolerated PO potassium. Discharged home with oral potassium. Follow up care with urology and PCP set up by .     Discharge Condition: Good    Disposition: Home or Self Care     Time spent on the discharge of the patient including review of hospital course with the patient. reviewing discharge medications and arranging follow-up care 35 minutes.  Patient was seen and examined on the date of discharge and determined to be suitable for discharge.    Follow Up:   ED Disposition Condition    Discharge Stable            ED Prescriptions       Medication Sig Dispense Start Date End Date Auth. Provider    potassium chloride (KLOR-CON) 10 MEQ TbSR Take 1 tablet (10 mEq total) by mouth once daily. 30 tablet 4/8/2024 5/8/2024 Thai Cormier PA-C    hydrOXYzine HCL (ATARAX)  25 MG tablet Take 1 tablet (25 mg total) by mouth 3 (three) times daily as needed for Anxiety. 28 tablet 4/8/2024 4/22/2024 Thai Cormier PA-C    NIFEdipine (PROCARDIA-XL) 60 MG (OSM) 24 hr tablet Take 1 tablet (60 mg total) by mouth once daily. 30 tablet 4/8/2024 5/8/2024 Thia Cormier PA-C    spironolactone (ALDACTONE) 50 MG tablet Take 1 tablet (50 mg total) by mouth once daily. 30 tablet 4/8/2024 5/8/2024 Thai Cormier PA-C          Follow-up Information    None         Future Appointments   Date Time Provider Department Center   5/3/2024 11:00 AM Negra Goodson MD [facility]

## 2024-04-08 NOTE — H&P
ED Observation Unit  History and Physical      I assumed care of this patient from the Main ED at onset of observation time, 11:15 AM on 04/08/2024.       History of Present Illness:  Nidhi Avendaño is a 53 y.o. female  brought in by paramedics for generalized weakness for the last couple of days.  Patient states she has a history of hyperkalemia as well as low potassium.  States she is also concerned of her elevated blood pressure.  Has a history of hypertension but due to reported loss of primary care patient has not taken blood pressure medication for probably 2 months.  She states she is concerned she has a history of renal cell carcinoma that was diagnosed at [facility] last October or November.  She has not followed up with any oncology or any other specialists.  She denies any chest pain just generalized fatigue in his concerned about her potassium.     She states she did have a couple episodes of vomiting today and in the phlegm noted some minimal bright red blood.  Denies any dark stool.    I reviewed the ED Provider Note dated 4/7/24 prior to my evaluation of this patient.  I reviewed all labs and imaging performed in the Main ED, prior to patient being placed in Observation. Patient was placed in the ED Observation Unit for acute kidney injury and hypokalemia.    ED Course:  Per review of yesterday's labs, no leukocytosis.  Hemoglobin 10.4 and hematocrit 32.9.  Patient does have history of anemia on previous labs.  CMP revealed hypokalemia of 2.7 and acute kidney injury with creatinine of 1.5.  Baseline creatinine is usually 0.7-1.0.  Magnesium within normal limits.  UA only notable for trace protein.  Was admitted to EDOU for continued IV fluids, potassium repletion, and repeat of chemistry in the morning.      PMHx   Past Medical History:   Diagnosis Date    Asthma     Blood transfusion     Depression     Hypertension     Nervously anxious     Personal history of endometriosis     Strabismus      Uterus, adenomyosis       Past Surgical History:   Procedure Laterality Date    APPENDECTOMY      BLADDER SUSPENSION      BREAST BIOPSY      BREAST CYST EXCISION      CHOLECYSTECTOMY      HYSTERECTOMY      REIMPLANT URETER IN BLADDER      STRABISMUS SURGERY  06/05/13    mr recession OU        Family Hx   Family History   Problem Relation Age of Onset    Diabetes Mother     Thyroid disease Mother     Diabetes Maternal Grandmother     Glaucoma Maternal Grandmother     Hypertension Maternal Grandmother     Stroke Maternal Grandmother     Diabetes Maternal Grandfather     Glaucoma Maternal Grandfather     Hypertension Maternal Grandfather         Social Hx   Social History     Socioeconomic History    Marital status:    Tobacco Use    Smoking status: Every Day     Current packs/day: 1.00     Types: Vaping w/o nicotine, Cigarettes    Smokeless tobacco: Never   Substance and Sexual Activity    Alcohol use: No    Drug use: Not Currently     Types: Heroin     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    Sexual activity: Not Currently     Birth control/protection: Other-see comments     Comment: vaginal cuff        Vital Signs   Vitals:    04/08/24 0124 04/08/24 0222 04/08/24 0359 04/08/24 0822   BP: (!) 195/100 (!) 172/85 (!) 146/84 (!) 159/87   BP Location:   Right arm Right arm   Patient Position:   Lying Lying   Pulse: 76 73 73 78   Resp: 18 18 18 16   Temp:   98.3 °F (36.8 °C) 97.6 °F (36.4 °C)   TempSrc:   Oral Oral   SpO2: 98% 98% 98% 97%        Review of Systems  ROS    Physical Exam  Physical Exam    Medications:   Scheduled Meds:   NIFEdipine  60 mg Oral Daily    potassium chloride SA  40 mEq Oral TID    spironolactone  50 mg Oral Daily     Continuous Infusions:   sodium chloride 0.9% 150 mL/hr at 04/08/24 1015     PRN Meds:.acetaminophen, HYDROcodone-acetaminophen, melatonin, ondansetron, sodium chloride 0.9%      Assessment/Plan:  SHANA  Hypokalemia      IV fluids, repeat chemistry in the  AM.  PO potassium repletion. Repeat chemistry in the AM.

## 2024-06-10 ENCOUNTER — NURSE TRIAGE (OUTPATIENT)
Dept: ADMINISTRATIVE | Facility: CLINIC | Age: 54
End: 2024-06-10
Payer: MEDICARE

## 2024-06-10 NOTE — TELEPHONE ENCOUNTER
Patient is calling with a questions regarding a print out of a MRI/US she received and swollen legs. I proceeded with triaging regarding the swelling of her legs. Patient is advised as per protocol    Reason for Disposition   Patient sounds very sick or weak to the triager    Additional Information   Negative: SEVERE difficulty breathing (e.g., struggling for each breath, speaks in single words)   Negative: Looks like a broken bone or dislocated joint (e.g., crooked or deformed)   Negative: Sounds like a life-threatening emergency to the triager   Negative: Chest pain   Negative: Followed a leg injury   Negative: [1] Followed an insect bite AND [2] localized swelling (e.g., small area of puffy or swollen skin)   Negative: Swelling of one ankle joint   Negative: Swelling of knee is main symptom   Negative: Pregnant   Negative: Postpartum (from 0 to 6 weeks after delivery)   Negative: [1] Heart failure suspected (e.g., ankle swelling, shortness of breath, weight gain) AND [2] recently in hospital for heart failure   Negative: Difficulty breathing at rest   Negative: Entire foot is cool or blue in comparison to other side   Negative: [1] Can't walk or can barely walk AND [2] new-onset   Negative: [1] Difficulty breathing with exertion (e.g., walking) AND [2] new-onset or worsening   Negative: [1] Red area or streak AND [2] fever   Negative: [1] Swelling is painful to touch AND [2] fever   Negative: [1] Cast on leg or ankle AND [2] now increased pain    Protocols used: Leg Swelling and Edema-A-AH